# Patient Record
Sex: FEMALE | Race: OTHER | Employment: FULL TIME | URBAN - METROPOLITAN AREA
[De-identification: names, ages, dates, MRNs, and addresses within clinical notes are randomized per-mention and may not be internally consistent; named-entity substitution may affect disease eponyms.]

---

## 2018-11-06 ENCOUNTER — HOSPITAL ENCOUNTER (OUTPATIENT)
Dept: LAB | Age: 48
Discharge: HOME | End: 2018-11-06
Payer: COMMERCIAL

## 2018-11-06 ENCOUNTER — HOSPITAL ENCOUNTER (OUTPATIENT)
Dept: GENERAL RADIOLOGY | Age: 48
Discharge: HOME | End: 2018-11-06
Attending: NEUROLOGICAL SURGERY
Payer: COMMERCIAL

## 2018-11-06 ENCOUNTER — HOSPITAL ENCOUNTER (OUTPATIENT)
Dept: PREADMISSION TESTING | Age: 48
Discharge: HOME | End: 2018-11-06
Payer: COMMERCIAL

## 2018-11-06 DIAGNOSIS — C71.1 MALIGNANT NEOPLASM OF FRONTAL LOBE OF BRAIN (HCC): ICD-10-CM

## 2018-11-06 LAB
ABO + RH BLD: NORMAL
ALBUMIN SERPL-MCNC: 3.9 G/DL (ref 3.4–5)
ALBUMIN/GLOB SERPL: 1.2 {RATIO} (ref 0.8–1.7)
ALP SERPL-CCNC: 71 U/L (ref 45–117)
ALT SERPL-CCNC: 50 U/L (ref 13–56)
ANION GAP SERPL CALC-SCNC: 10 MMOL/L (ref 3–18)
APPEARANCE UR: CLEAR
APTT PPP: 22 SEC (ref 23–36.4)
AST SERPL-CCNC: 21 U/L (ref 15–37)
ATRIAL RATE: 71 BPM
BACTERIA URNS QL MICRO: ABNORMAL /HPF
BASOPHILS # BLD: 0 K/UL (ref 0–0.1)
BASOPHILS NFR BLD: 0 % (ref 0–2)
BILIRUB SERPL-MCNC: 0.5 MG/DL (ref 0.2–1)
BILIRUB UR QL: NEGATIVE
BLOOD GROUP ANTIBODIES SERPL: NORMAL
BUN SERPL-MCNC: 17 MG/DL (ref 7–18)
BUN/CREAT SERPL: 17 (ref 12–20)
CALCIUM SERPL-MCNC: 9 MG/DL (ref 8.5–10.1)
CALCULATED P AXIS, ECG09: 46 DEGREES
CALCULATED R AXIS, ECG10: 67 DEGREES
CALCULATED T AXIS, ECG11: 76 DEGREES
CHLORIDE SERPL-SCNC: 105 MMOL/L (ref 100–108)
CO2 SERPL-SCNC: 27 MMOL/L (ref 21–32)
COLOR UR: YELLOW
CREAT SERPL-MCNC: 1 MG/DL (ref 0.6–1.3)
DIAGNOSIS, 93000: NORMAL
DIFFERENTIAL METHOD BLD: ABNORMAL
EOSINOPHIL # BLD: 0 K/UL (ref 0–0.4)
EOSINOPHIL NFR BLD: 0 % (ref 0–5)
EPITH CASTS URNS QL MICRO: ABNORMAL /LPF (ref 0–5)
ERYTHROCYTE [DISTWIDTH] IN BLOOD BY AUTOMATED COUNT: 15.1 % (ref 11.6–14.5)
GLOBULIN SER CALC-MCNC: 3.3 G/DL (ref 2–4)
GLUCOSE SERPL-MCNC: 137 MG/DL (ref 74–99)
GLUCOSE UR STRIP.AUTO-MCNC: NEGATIVE MG/DL
HCT VFR BLD AUTO: 43.1 % (ref 35–45)
HGB BLD-MCNC: 14.1 G/DL (ref 12–16)
HGB UR QL STRIP: NEGATIVE
INR PPP: 1 (ref 0.8–1.2)
KETONES UR QL STRIP.AUTO: NEGATIVE MG/DL
LEUKOCYTE ESTERASE UR QL STRIP.AUTO: NEGATIVE
LYMPHOCYTES # BLD: 1.7 K/UL (ref 0.9–3.6)
LYMPHOCYTES NFR BLD: 15 % (ref 21–52)
MCH RBC QN AUTO: 28.4 PG (ref 24–34)
MCHC RBC AUTO-ENTMCNC: 32.7 G/DL (ref 31–37)
MCV RBC AUTO: 86.7 FL (ref 74–97)
MONOCYTES # BLD: 0.8 K/UL (ref 0.05–1.2)
MONOCYTES NFR BLD: 7 % (ref 3–10)
MUCOUS THREADS URNS QL MICRO: ABNORMAL /LPF
NEUTS SEG # BLD: 8.6 K/UL (ref 1.8–8)
NEUTS SEG NFR BLD: 78 % (ref 40–73)
NITRITE UR QL STRIP.AUTO: NEGATIVE
P-R INTERVAL, ECG05: 146 MS
PH UR STRIP: 6 [PH] (ref 5–8)
PLATELET # BLD AUTO: 283 K/UL (ref 135–420)
PMV BLD AUTO: 11 FL (ref 9.2–11.8)
POTASSIUM SERPL-SCNC: 3.8 MMOL/L (ref 3.5–5.5)
PROT SERPL-MCNC: 7.2 G/DL (ref 6.4–8.2)
PROT UR STRIP-MCNC: NEGATIVE MG/DL
PROTHROMBIN TIME: 12.6 SEC (ref 11.5–15.2)
Q-T INTERVAL, ECG07: 400 MS
QRS DURATION, ECG06: 86 MS
QTC CALCULATION (BEZET), ECG08: 434 MS
RBC # BLD AUTO: 4.97 M/UL (ref 4.2–5.3)
RBC #/AREA URNS HPF: ABNORMAL /HPF (ref 0–5)
SODIUM SERPL-SCNC: 142 MMOL/L (ref 136–145)
SP GR UR REFRACTOMETRY: 1.02 (ref 1–1.03)
SPECIMEN EXP DATE BLD: NORMAL
UROBILINOGEN UR QL STRIP.AUTO: 0.2 EU/DL (ref 0.2–1)
VENTRICULAR RATE, ECG03: 71 BPM
WBC # BLD AUTO: 11.1 K/UL (ref 4.6–13.2)
WBC URNS QL MICRO: ABNORMAL /HPF (ref 0–4)

## 2018-11-06 PROCEDURE — 71046 X-RAY EXAM CHEST 2 VIEWS: CPT

## 2018-11-06 PROCEDURE — 85025 COMPLETE CBC W/AUTO DIFF WBC: CPT | Performed by: NEUROLOGICAL SURGERY

## 2018-11-06 PROCEDURE — 85610 PROTHROMBIN TIME: CPT | Performed by: NEUROLOGICAL SURGERY

## 2018-11-06 PROCEDURE — 80053 COMPREHEN METABOLIC PANEL: CPT | Performed by: NEUROLOGICAL SURGERY

## 2018-11-06 PROCEDURE — 87086 URINE CULTURE/COLONY COUNT: CPT | Performed by: NEUROLOGICAL SURGERY

## 2018-11-06 PROCEDURE — 85730 THROMBOPLASTIN TIME PARTIAL: CPT | Performed by: NEUROLOGICAL SURGERY

## 2018-11-06 PROCEDURE — 36415 COLL VENOUS BLD VENIPUNCTURE: CPT | Performed by: NEUROLOGICAL SURGERY

## 2018-11-06 PROCEDURE — 93005 ELECTROCARDIOGRAM TRACING: CPT

## 2018-11-06 PROCEDURE — 86900 BLOOD TYPING SEROLOGIC ABO: CPT | Performed by: NEUROLOGICAL SURGERY

## 2018-11-06 PROCEDURE — 81001 URINALYSIS AUTO W/SCOPE: CPT | Performed by: NEUROLOGICAL SURGERY

## 2018-11-06 RX ORDER — LANOLIN ALCOHOL/MO/W.PET/CERES
1000 CREAM (GRAM) TOPICAL DAILY
COMMUNITY
End: 2021-06-01

## 2018-11-06 RX ORDER — RANITIDINE 150 MG/1
150 TABLET, FILM COATED ORAL 2 TIMES DAILY
COMMUNITY
End: 2022-04-19

## 2018-11-06 RX ORDER — LEVETIRACETAM 750 MG/1
750 TABLET ORAL 2 TIMES DAILY
COMMUNITY
End: 2022-04-19

## 2018-11-06 RX ORDER — DEXAMETHASONE 4 MG/1
4 TABLET ORAL EVERY 12 HOURS
COMMUNITY
End: 2021-06-01

## 2018-11-06 NOTE — PERIOP NOTES
PAT - SURGICAL PRE-ADMISSION INSTRUCTIONS    NAME:  Erasmo Sanchez                                                          TODAY'S DATE:  11/6/2018    SURGERY DATE:  11/14/2018                                  SURGERY ARRIVAL TIME:   0700    1. Do NOT eat or drink anything, including candy or gum, after MIDNIGHT on 11/13/18 , unless you have specific instructions from your Surgeon or Anesthesia Provider to do so. 2. No smoking on the day of surgery. 3. No alcohol 24 hours prior to the day of surgery. 4. No recreational drugs for one week prior to the day of surgery. 5. Leave all valuables, including money/purse, at home. 6. Remove all jewelry, nail polish, makeup (including mascara); no lotions, powders, deodorant, or perfume/cologne/after shave. 7. Glasses/Contact lenses and Dentures may be worn to the hospital.  They will be removed prior to surgery. 8. Call your doctor if symptoms of a cold or illness develop within 24 ours prior to surgery. 9. AN ADULT MUST DRIVE YOU HOME AFTER OUTPATIENT SURGERY. 10. If you are having an OUTPATIENT procedure, please make arrangements for a responsible adult to be with you for 24 hours after your surgery. 11. If you are admitted to the hospital, you will be assigned to a bed after surgery is complete. Normally a family member will not be able to see you until you are in your assigned bed. 15. Family is encouraged to accompany you to the hospital.  We ask visitors in the treatment area to be limited to ONE person at a time to ensure patient privacy. EXCEPTIONS WILL BE MADE AS NEEDED. 15. Children under 12 are discouraged from entering the treatment area and need to be supervised by an adult when in the waiting room. Special Instructions:     Take these medications the morning of surgery with a sip of water:  KEPPRA, SYNTHROID, RANITIDINE    Patient Prep:    shower with anti-bacterial soap    These surgical instructions were reviewed with PATIENT during the PAT VISIT. A printed copy of the instructions was provided to PATIENT. Directions: On the morning of surgery, please go to the [facility]. Enter the building from the [facility] entrance, 1st floor (next to the Emergency Room entrance). Take the elevator to the 2nd floor. Sign in at the Registration Desk.     If you have any questions and/or concerns, please do not hesitate to call:  (Prior to the day of surgery)  [facility] unit:  [phone]  (Day of surgery)  [facility] unit:  [phone]

## 2018-11-08 LAB
BACTERIA SPEC CULT: NORMAL
SERVICE CMNT-IMP: NORMAL

## 2018-11-13 ENCOUNTER — ANESTHESIA EVENT (OUTPATIENT)
Dept: SURGERY | Age: 48
DRG: 027 | End: 2018-11-13
Payer: COMMERCIAL

## 2018-11-14 ENCOUNTER — ANESTHESIA (OUTPATIENT)
Dept: SURGERY | Age: 48
DRG: 027 | End: 2018-11-14
Payer: COMMERCIAL

## 2018-11-14 ENCOUNTER — APPOINTMENT (OUTPATIENT)
Dept: MRI IMAGING | Age: 48
DRG: 027 | End: 2018-11-14
Attending: NEUROLOGICAL SURGERY
Payer: COMMERCIAL

## 2018-11-14 ENCOUNTER — HOSPITAL ENCOUNTER (INPATIENT)
Age: 48
LOS: 4 days | Discharge: HOME | DRG: 027 | End: 2018-11-18
Attending: NEUROLOGICAL SURGERY | Admitting: NEUROLOGICAL SURGERY
Payer: COMMERCIAL

## 2018-11-14 DIAGNOSIS — G93.89 RIGHT FRONTAL LOBE MASS: Primary | ICD-10-CM

## 2018-11-14 LAB — HCG UR QL: NEGATIVE

## 2018-11-14 PROCEDURE — 03HY32Z INSERTION OF MONITORING DEVICE INTO UPPER ARTERY, PERCUTANEOUS APPROACH: ICD-10-PCS | Performed by: ANESTHESIOLOGY

## 2018-11-14 PROCEDURE — 77030020236 HC IMPL CLMP CRNOFX AESC -B: Performed by: NEUROLOGICAL SURGERY

## 2018-11-14 PROCEDURE — 77030011894 HC TY FOL URIMTR BARD -B: Performed by: NEUROLOGICAL SURGERY

## 2018-11-14 PROCEDURE — 74011250636 HC RX REV CODE- 250/636

## 2018-11-14 PROCEDURE — 77030018673: Performed by: NEUROLOGICAL SURGERY

## 2018-11-14 PROCEDURE — 88377 M/PHMTRC ALYS ISHQUANT/SEMIQ: CPT

## 2018-11-14 PROCEDURE — 74011250637 HC RX REV CODE- 250/637: Performed by: NURSE ANESTHETIST, CERTIFIED REGISTERED

## 2018-11-14 PROCEDURE — 88341 IMHCHEM/IMCYTCHM EA ADD ANTB: CPT

## 2018-11-14 PROCEDURE — 00B00ZZ EXCISION OF BRAIN, OPEN APPROACH: ICD-10-PCS | Performed by: NEUROLOGICAL SURGERY

## 2018-11-14 PROCEDURE — A9575 INJ GADOTERATE MEGLUMI 0.1ML: HCPCS | Performed by: NEUROLOGICAL SURGERY

## 2018-11-14 PROCEDURE — 77030002946 HC SUT NRLN J&J -B: Performed by: NEUROLOGICAL SURGERY

## 2018-11-14 PROCEDURE — 77030020268 HC MISC GENERAL SUPPLY: Performed by: NEUROLOGICAL SURGERY

## 2018-11-14 PROCEDURE — 88307 TISSUE EXAM BY PATHOLOGIST: CPT

## 2018-11-14 PROCEDURE — 77030032490 HC SLV COMPR SCD KNE COVD -B: Performed by: NEUROLOGICAL SURGERY

## 2018-11-14 PROCEDURE — 77030014647 HC SEAL FBRN TISSL BAXT -D: Performed by: NEUROLOGICAL SURGERY

## 2018-11-14 PROCEDURE — 76060000040 HC ANESTHESIA 4.5 TO 5 HR: Performed by: NEUROLOGICAL SURGERY

## 2018-11-14 PROCEDURE — 77030008683 HC TU ET CUF COVD -A: Performed by: NURSE ANESTHETIST, CERTIFIED REGISTERED

## 2018-11-14 PROCEDURE — 77030029099 HC BN WAX SSPC -A: Performed by: NEUROLOGICAL SURGERY

## 2018-11-14 PROCEDURE — 76010000176 HC OR TIME 4.5 TO 5 HR INTENSV-TIER 1: Performed by: NEUROLOGICAL SURGERY

## 2018-11-14 PROCEDURE — 77030034348 HC TIP STR SONOPET DISP STRY -E: Performed by: NEUROLOGICAL SURGERY

## 2018-11-14 PROCEDURE — 77030005401 HC CATH RAD ARRO -A: Performed by: NURSE ANESTHETIST, CERTIFIED REGISTERED

## 2018-11-14 PROCEDURE — 77030013079 HC BLNKT BAIR HGGR 3M -A: Performed by: NURSE ANESTHETIST, CERTIFIED REGISTERED

## 2018-11-14 PROCEDURE — 77030018390 HC SPNG HEMSTAT2 J&J -B: Performed by: NEUROLOGICAL SURGERY

## 2018-11-14 PROCEDURE — 77030030722 HC PIN SKULL MAYFLD INLC -B: Performed by: NEUROLOGICAL SURGERY

## 2018-11-14 PROCEDURE — 77030013473 HC CRD BPLR AESC -A: Performed by: NEUROLOGICAL SURGERY

## 2018-11-14 PROCEDURE — 4A133B1 MONITORING OF ARTERIAL PRESSURE, PERIPHERAL, PERCUTANEOUS APPROACH: ICD-10-PCS | Performed by: ANESTHESIOLOGY

## 2018-11-14 PROCEDURE — 74011250636 HC RX REV CODE- 250/636: Performed by: NEUROLOGICAL SURGERY

## 2018-11-14 PROCEDURE — 74011000272 HC RX REV CODE- 272: Performed by: NEUROLOGICAL SURGERY

## 2018-11-14 PROCEDURE — 77030008477 HC STYL SATN SLP COVD -A: Performed by: NURSE ANESTHETIST, CERTIFIED REGISTERED

## 2018-11-14 PROCEDURE — 65610000009 HC RM ICU NEURO

## 2018-11-14 PROCEDURE — 74011000250 HC RX REV CODE- 250: Performed by: NEUROLOGICAL SURGERY

## 2018-11-14 PROCEDURE — 74011000250 HC RX REV CODE- 250

## 2018-11-14 PROCEDURE — 77030037673 HC TBNG VISTA V-LYTE PMP STRY -B: Performed by: NEUROLOGICAL SURGERY

## 2018-11-14 PROCEDURE — 77030026918 HC ADMN ST IV BLD BD -A: Performed by: NURSE ANESTHETIST, CERTIFIED REGISTERED

## 2018-11-14 PROCEDURE — 76210000006 HC OR PH I REC 0.5 TO 1 HR: Performed by: NEUROLOGICAL SURGERY

## 2018-11-14 PROCEDURE — 74011250636 HC RX REV CODE- 250/636: Performed by: NURSE ANESTHETIST, CERTIFIED REGISTERED

## 2018-11-14 PROCEDURE — 88342 IMHCHEM/IMCYTCHM 1ST ANTB: CPT

## 2018-11-14 PROCEDURE — 74011000258 HC RX REV CODE- 258: Performed by: NEUROLOGICAL SURGERY

## 2018-11-14 PROCEDURE — 77030020480 HC CLP SCLP RANY DISP J&J -A: Performed by: NEUROLOGICAL SURGERY

## 2018-11-14 PROCEDURE — 77030013797 HC KT TRNSDUC PRSSR EDWD -A: Performed by: NURSE ANESTHETIST, CERTIFIED REGISTERED

## 2018-11-14 PROCEDURE — 4A133J1 MONITORING OF ARTERIAL PULSE, PERIPHERAL, PERCUTANEOUS APPROACH: ICD-10-PCS | Performed by: ANESTHESIOLOGY

## 2018-11-14 PROCEDURE — 77030019908 HC STETH ESOPH SIMS -A: Performed by: NURSE ANESTHETIST, CERTIFIED REGISTERED

## 2018-11-14 PROCEDURE — 77030029716 HC SEAL SPN HEMSTAT DURASL KT INLC -F: Performed by: NEUROLOGICAL SURGERY

## 2018-11-14 PROCEDURE — 77030003028 HC SUT VCRL J&J -A: Performed by: NEUROLOGICAL SURGERY

## 2018-11-14 PROCEDURE — 77030004472 HC BUR TAPR MEDT -B: Performed by: NEUROLOGICAL SURGERY

## 2018-11-14 PROCEDURE — 88331 PATH CONSLTJ SURG 1 BLK 1SPC: CPT

## 2018-11-14 PROCEDURE — 81025 URINE PREGNANCY TEST: CPT

## 2018-11-14 PROCEDURE — 8E09XBH COMPUTER ASSISTED PROCEDURE OF HEAD AND NECK REGION, WITH MAGNETIC RESONANCE IMAGING: ICD-10-PCS | Performed by: NEUROLOGICAL SURGERY

## 2018-11-14 PROCEDURE — 70552 MRI BRAIN STEM W/DYE: CPT

## 2018-11-14 PROCEDURE — 74011250637 HC RX REV CODE- 250/637: Performed by: NEUROLOGICAL SURGERY

## 2018-11-14 PROCEDURE — 77030003162 HC GRFT DURA SUB MEDT -F: Performed by: NEUROLOGICAL SURGERY

## 2018-11-14 DEVICE — DURA 62105 SUBSTITUTE DUREPAIR 3X3IN NCE
Type: IMPLANTABLE DEVICE | Site: CRANIAL | Status: FUNCTIONAL
Brand: DUREPAIR®

## 2018-11-14 DEVICE — CRANIOFIX 2 TITANIUM CLAMP 11MM
Type: IMPLANTABLE DEVICE | Site: SKULL | Status: FUNCTIONAL
Brand: CRANIOFIX2

## 2018-11-14 RX ORDER — MAGNESIUM SULFATE 100 %
4 CRYSTALS MISCELLANEOUS AS NEEDED
Status: DISCONTINUED | OUTPATIENT
Start: 2018-11-14 | End: 2018-11-14 | Stop reason: HOSPADM

## 2018-11-14 RX ORDER — PROPOFOL 10 MG/ML
INJECTION, EMULSION INTRAVENOUS AS NEEDED
Status: DISCONTINUED | OUTPATIENT
Start: 2018-11-14 | End: 2018-11-14 | Stop reason: HOSPADM

## 2018-11-14 RX ORDER — DEXAMETHASONE SODIUM PHOSPHATE 4 MG/ML
4 INJECTION, SOLUTION INTRA-ARTICULAR; INTRALESIONAL; INTRAMUSCULAR; INTRAVENOUS; SOFT TISSUE EVERY 6 HOURS
Status: DISCONTINUED | OUTPATIENT
Start: 2018-11-14 | End: 2018-11-15

## 2018-11-14 RX ORDER — LIDOCAINE HYDROCHLORIDE 20 MG/ML
INJECTION, SOLUTION EPIDURAL; INFILTRATION; INTRACAUDAL; PERINEURAL AS NEEDED
Status: DISCONTINUED | OUTPATIENT
Start: 2018-11-14 | End: 2018-11-14 | Stop reason: HOSPADM

## 2018-11-14 RX ORDER — FUROSEMIDE 10 MG/ML
INJECTION INTRAMUSCULAR; INTRAVENOUS AS NEEDED
Status: DISCONTINUED | OUTPATIENT
Start: 2018-11-14 | End: 2018-11-14 | Stop reason: HOSPADM

## 2018-11-14 RX ORDER — LEVETIRACETAM 10 MG/ML
INJECTION INTRAVASCULAR AS NEEDED
Status: DISCONTINUED | OUTPATIENT
Start: 2018-11-14 | End: 2018-11-14 | Stop reason: HOSPADM

## 2018-11-14 RX ORDER — SODIUM CHLORIDE, SODIUM LACTATE, POTASSIUM CHLORIDE, CALCIUM CHLORIDE 600; 310; 30; 20 MG/100ML; MG/100ML; MG/100ML; MG/100ML
100 INJECTION, SOLUTION INTRAVENOUS CONTINUOUS
Status: DISCONTINUED | OUTPATIENT
Start: 2018-11-14 | End: 2018-11-14 | Stop reason: HOSPADM

## 2018-11-14 RX ORDER — SODIUM CHLORIDE 9 MG/ML
75 INJECTION, SOLUTION INTRAVENOUS CONTINUOUS
Status: DISCONTINUED | OUTPATIENT
Start: 2018-11-14 | End: 2018-11-15

## 2018-11-14 RX ORDER — DEXTROSE MONOHYDRATE 25 G/50ML
25-50 INJECTION, SOLUTION INTRAVENOUS AS NEEDED
Status: DISCONTINUED | OUTPATIENT
Start: 2018-11-14 | End: 2018-11-14 | Stop reason: HOSPADM

## 2018-11-14 RX ORDER — ACETAMINOPHEN 325 MG/1
650 TABLET ORAL
Status: DISCONTINUED | OUTPATIENT
Start: 2018-11-14 | End: 2018-11-18 | Stop reason: HOSPADM

## 2018-11-14 RX ORDER — FENTANYL CITRATE 50 UG/ML
INJECTION, SOLUTION INTRAMUSCULAR; INTRAVENOUS AS NEEDED
Status: DISCONTINUED | OUTPATIENT
Start: 2018-11-14 | End: 2018-11-14 | Stop reason: HOSPADM

## 2018-11-14 RX ORDER — VANCOMYCIN HYDROCHLORIDE 1 G/20ML
INJECTION, POWDER, LYOPHILIZED, FOR SOLUTION INTRAVENOUS AS NEEDED
Status: DISCONTINUED | OUTPATIENT
Start: 2018-11-14 | End: 2018-11-14 | Stop reason: HOSPADM

## 2018-11-14 RX ORDER — ONDANSETRON 2 MG/ML
4 INJECTION INTRAMUSCULAR; INTRAVENOUS
Status: DISCONTINUED | OUTPATIENT
Start: 2018-11-14 | End: 2018-11-18 | Stop reason: HOSPADM

## 2018-11-14 RX ORDER — LABETALOL HYDROCHLORIDE 5 MG/ML
INJECTION, SOLUTION INTRAVENOUS AS NEEDED
Status: DISCONTINUED | OUTPATIENT
Start: 2018-11-14 | End: 2018-11-14 | Stop reason: HOSPADM

## 2018-11-14 RX ORDER — SODIUM CHLORIDE 9 MG/ML
INJECTION, SOLUTION INTRAVENOUS
Status: DISCONTINUED | OUTPATIENT
Start: 2018-11-14 | End: 2018-11-14 | Stop reason: HOSPADM

## 2018-11-14 RX ORDER — NEOSTIGMINE METHYLSULFATE 5 MG/5 ML
SYRINGE (ML) INTRAVENOUS AS NEEDED
Status: DISCONTINUED | OUTPATIENT
Start: 2018-11-14 | End: 2018-11-14 | Stop reason: HOSPADM

## 2018-11-14 RX ORDER — POTASSIUM CHLORIDE AND SODIUM CHLORIDE 900; 300 MG/100ML; MG/100ML
INJECTION, SOLUTION INTRAVENOUS CONTINUOUS
Status: DISCONTINUED | OUTPATIENT
Start: 2018-11-14 | End: 2018-11-15

## 2018-11-14 RX ORDER — HYDROMORPHONE HYDROCHLORIDE 1 MG/ML
1 INJECTION, SOLUTION INTRAMUSCULAR; INTRAVENOUS; SUBCUTANEOUS
Status: DISCONTINUED | OUTPATIENT
Start: 2018-11-14 | End: 2018-11-18 | Stop reason: HOSPADM

## 2018-11-14 RX ORDER — OXYCODONE AND ACETAMINOPHEN 5; 325 MG/1; MG/1
1 TABLET ORAL AS NEEDED
Status: DISCONTINUED | OUTPATIENT
Start: 2018-11-14 | End: 2018-11-14 | Stop reason: HOSPADM

## 2018-11-14 RX ORDER — RANITIDINE 150 MG/1
150 TABLET, FILM COATED ORAL 2 TIMES DAILY
Status: DISCONTINUED | OUTPATIENT
Start: 2018-11-14 | End: 2018-11-18 | Stop reason: HOSPADM

## 2018-11-14 RX ORDER — HYDROMORPHONE HYDROCHLORIDE 2 MG/ML
0.5 INJECTION, SOLUTION INTRAMUSCULAR; INTRAVENOUS; SUBCUTANEOUS
Status: DISCONTINUED | OUTPATIENT
Start: 2018-11-14 | End: 2018-11-14 | Stop reason: HOSPADM

## 2018-11-14 RX ORDER — LEVOTHYROXINE SODIUM 100 UG/1
200 TABLET ORAL
Status: DISCONTINUED | OUTPATIENT
Start: 2018-11-15 | End: 2018-11-18 | Stop reason: HOSPADM

## 2018-11-14 RX ORDER — GLYCOPYRROLATE 0.2 MG/ML
INJECTION INTRAMUSCULAR; INTRAVENOUS AS NEEDED
Status: DISCONTINUED | OUTPATIENT
Start: 2018-11-14 | End: 2018-11-14 | Stop reason: HOSPADM

## 2018-11-14 RX ORDER — NICARDIPINE HYDROCHLORIDE 0.1 MG/ML
INJECTION INTRAVENOUS
Status: DISCONTINUED | OUTPATIENT
Start: 2018-11-14 | End: 2018-11-14 | Stop reason: HOSPADM

## 2018-11-14 RX ORDER — HYDROMORPHONE HYDROCHLORIDE 2 MG/ML
0.2 INJECTION, SOLUTION INTRAMUSCULAR; INTRAVENOUS; SUBCUTANEOUS AS NEEDED
Status: DISCONTINUED | OUTPATIENT
Start: 2018-11-14 | End: 2018-11-14 | Stop reason: HOSPADM

## 2018-11-14 RX ORDER — ONDANSETRON 2 MG/ML
INJECTION INTRAMUSCULAR; INTRAVENOUS AS NEEDED
Status: DISCONTINUED | OUTPATIENT
Start: 2018-11-14 | End: 2018-11-14 | Stop reason: HOSPADM

## 2018-11-14 RX ORDER — MANNITOL 20 G/100ML
INJECTION, SOLUTION INTRAVENOUS
Status: DISCONTINUED | OUTPATIENT
Start: 2018-11-14 | End: 2018-11-14 | Stop reason: HOSPADM

## 2018-11-14 RX ORDER — FAMOTIDINE 20 MG/1
20 TABLET, FILM COATED ORAL ONCE
Status: COMPLETED | OUTPATIENT
Start: 2018-11-14 | End: 2018-11-14

## 2018-11-14 RX ORDER — DIPHENHYDRAMINE HYDROCHLORIDE 50 MG/ML
12.5 INJECTION, SOLUTION INTRAMUSCULAR; INTRAVENOUS
Status: DISCONTINUED | OUTPATIENT
Start: 2018-11-14 | End: 2018-11-14 | Stop reason: HOSPADM

## 2018-11-14 RX ORDER — SCOLOPAMINE TRANSDERMAL SYSTEM 1 MG/1
PATCH, EXTENDED RELEASE TRANSDERMAL
Status: DISPENSED
Start: 2018-11-14 | End: 2018-11-14

## 2018-11-14 RX ORDER — SODIUM CHLORIDE, SODIUM LACTATE, POTASSIUM CHLORIDE, CALCIUM CHLORIDE 600; 310; 30; 20 MG/100ML; MG/100ML; MG/100ML; MG/100ML
50 INJECTION, SOLUTION INTRAVENOUS CONTINUOUS
Status: DISCONTINUED | OUTPATIENT
Start: 2018-11-14 | End: 2018-11-14

## 2018-11-14 RX ORDER — VECURONIUM BROMIDE FOR INJECTION 1 MG/ML
INJECTION, POWDER, LYOPHILIZED, FOR SOLUTION INTRAVENOUS AS NEEDED
Status: DISCONTINUED | OUTPATIENT
Start: 2018-11-14 | End: 2018-11-14 | Stop reason: HOSPADM

## 2018-11-14 RX ORDER — SCOLOPAMINE TRANSDERMAL SYSTEM 1 MG/1
1 PATCH, EXTENDED RELEASE TRANSDERMAL
Status: COMPLETED | OUTPATIENT
Start: 2018-11-14 | End: 2018-11-17

## 2018-11-14 RX ORDER — MIDAZOLAM HYDROCHLORIDE 1 MG/ML
INJECTION, SOLUTION INTRAMUSCULAR; INTRAVENOUS AS NEEDED
Status: DISCONTINUED | OUTPATIENT
Start: 2018-11-14 | End: 2018-11-14 | Stop reason: HOSPADM

## 2018-11-14 RX ORDER — INSULIN LISPRO 100 [IU]/ML
INJECTION, SOLUTION INTRAVENOUS; SUBCUTANEOUS ONCE
Status: DISCONTINUED | OUTPATIENT
Start: 2018-11-14 | End: 2018-11-14 | Stop reason: HOSPADM

## 2018-11-14 RX ORDER — SODIUM CHLORIDE 9 MG/ML
50 INJECTION, SOLUTION INTRAVENOUS CONTINUOUS
Status: DISCONTINUED | OUTPATIENT
Start: 2018-11-14 | End: 2018-11-14

## 2018-11-14 RX ORDER — SODIUM CHLORIDE 0.9 % (FLUSH) 0.9 %
5-10 SYRINGE (ML) INJECTION AS NEEDED
Status: DISCONTINUED | OUTPATIENT
Start: 2018-11-14 | End: 2018-11-14 | Stop reason: HOSPADM

## 2018-11-14 RX ORDER — EPHEDRINE SULFATE 50 MG/ML
INJECTION, SOLUTION INTRAVENOUS AS NEEDED
Status: DISCONTINUED | OUTPATIENT
Start: 2018-11-14 | End: 2018-11-14 | Stop reason: HOSPADM

## 2018-11-14 RX ORDER — ONDANSETRON 2 MG/ML
INJECTION INTRAMUSCULAR; INTRAVENOUS
Status: COMPLETED
Start: 2018-11-14 | End: 2018-11-14

## 2018-11-14 RX ORDER — LIDOCAINE HYDROCHLORIDE AND EPINEPHRINE 10; 10 MG/ML; UG/ML
INJECTION, SOLUTION INFILTRATION; PERINEURAL AS NEEDED
Status: DISCONTINUED | OUTPATIENT
Start: 2018-11-14 | End: 2018-11-14 | Stop reason: HOSPADM

## 2018-11-14 RX ORDER — GADOTERATE MEGLUMINE 376.9 MG/ML
20 INJECTION INTRAVENOUS
Status: COMPLETED | OUTPATIENT
Start: 2018-11-14 | End: 2018-11-14

## 2018-11-14 RX ORDER — OXYCODONE AND ACETAMINOPHEN 5; 325 MG/1; MG/1
1 TABLET ORAL
Status: DISCONTINUED | OUTPATIENT
Start: 2018-11-14 | End: 2018-11-18 | Stop reason: HOSPADM

## 2018-11-14 RX ORDER — DEXAMETHASONE SODIUM PHOSPHATE 4 MG/ML
INJECTION, SOLUTION INTRA-ARTICULAR; INTRALESIONAL; INTRAMUSCULAR; INTRAVENOUS; SOFT TISSUE AS NEEDED
Status: DISCONTINUED | OUTPATIENT
Start: 2018-11-14 | End: 2018-11-14 | Stop reason: HOSPADM

## 2018-11-14 RX ORDER — HYDROMORPHONE HYDROCHLORIDE 1 MG/ML
INJECTION, SOLUTION INTRAMUSCULAR; INTRAVENOUS; SUBCUTANEOUS AS NEEDED
Status: DISCONTINUED | OUTPATIENT
Start: 2018-11-14 | End: 2018-11-14 | Stop reason: HOSPADM

## 2018-11-14 RX ADMIN — VECURONIUM BROMIDE FOR INJECTION 2 MG: 1 INJECTION, POWDER, LYOPHILIZED, FOR SOLUTION INTRAVENOUS at 11:00

## 2018-11-14 RX ADMIN — HYDROMORPHONE HYDROCHLORIDE 1 MG: 1 INJECTION, SOLUTION INTRAMUSCULAR; INTRAVENOUS; SUBCUTANEOUS at 10:03

## 2018-11-14 RX ADMIN — VECURONIUM BROMIDE FOR INJECTION 1 MG: 1 INJECTION, POWDER, LYOPHILIZED, FOR SOLUTION INTRAVENOUS at 12:15

## 2018-11-14 RX ADMIN — NICARDIPINE HYDROCHLORIDE 2.5 MG/HR: 2.5 INJECTION INTRAVENOUS at 18:48

## 2018-11-14 RX ADMIN — EPHEDRINE SULFATE 10 MG: 50 INJECTION, SOLUTION INTRAVENOUS at 10:03

## 2018-11-14 RX ADMIN — GADOTERATE MEGLUMINE 20 ML: 376.9 INJECTION INTRAVENOUS at 07:26

## 2018-11-14 RX ADMIN — MIDAZOLAM HYDROCHLORIDE 2 MG: 1 INJECTION, SOLUTION INTRAMUSCULAR; INTRAVENOUS at 09:08

## 2018-11-14 RX ADMIN — ONDANSETRON 4 MG: 2 INJECTION INTRAMUSCULAR; INTRAVENOUS at 13:35

## 2018-11-14 RX ADMIN — LABETALOL HYDROCHLORIDE 5 MG: 5 INJECTION, SOLUTION INTRAVENOUS at 13:52

## 2018-11-14 RX ADMIN — HYDROMORPHONE HYDROCHLORIDE 1 MG: 1 INJECTION, SOLUTION INTRAMUSCULAR; INTRAVENOUS; SUBCUTANEOUS at 23:55

## 2018-11-14 RX ADMIN — PROPOFOL 150 MG: 10 INJECTION, EMULSION INTRAVENOUS at 09:16

## 2018-11-14 RX ADMIN — DEXAMETHASONE SODIUM PHOSPHATE 10 MG: 4 INJECTION, SOLUTION INTRA-ARTICULAR; INTRALESIONAL; INTRAMUSCULAR; INTRAVENOUS; SOFT TISSUE at 09:23

## 2018-11-14 RX ADMIN — VECURONIUM BROMIDE FOR INJECTION 10 MG: 1 INJECTION, POWDER, LYOPHILIZED, FOR SOLUTION INTRAVENOUS at 09:17

## 2018-11-14 RX ADMIN — RANITIDINE 150 MG: 150 TABLET ORAL at 20:50

## 2018-11-14 RX ADMIN — DEXAMETHASONE SODIUM PHOSPHATE 10 MG: 4 INJECTION, SOLUTION INTRA-ARTICULAR; INTRALESIONAL; INTRAMUSCULAR; INTRAVENOUS; SOFT TISSUE at 12:30

## 2018-11-14 RX ADMIN — LEVETIRACETAM 750 MG: 100 INJECTION INTRAVENOUS at 21:04

## 2018-11-14 RX ADMIN — CEFAZOLIN 3 G: 1 INJECTION, POWDER, FOR SOLUTION INTRAMUSCULAR; INTRAVENOUS; PARENTERAL at 13:03

## 2018-11-14 RX ADMIN — FAMOTIDINE 20 MG: 20 TABLET ORAL at 06:35

## 2018-11-14 RX ADMIN — FENTANYL CITRATE 200 MCG: 50 INJECTION, SOLUTION INTRAMUSCULAR; INTRAVENOUS at 09:14

## 2018-11-14 RX ADMIN — LABETALOL HYDROCHLORIDE 5 MG: 5 INJECTION, SOLUTION INTRAVENOUS at 13:55

## 2018-11-14 RX ADMIN — LIDOCAINE HYDROCHLORIDE 100 MG: 20 INJECTION, SOLUTION EPIDURAL; INFILTRATION; INTRACAUDAL; PERINEURAL at 09:14

## 2018-11-14 RX ADMIN — HYDROMORPHONE HYDROCHLORIDE 1 MG: 1 INJECTION, SOLUTION INTRAMUSCULAR; INTRAVENOUS; SUBCUTANEOUS at 17:42

## 2018-11-14 RX ADMIN — DEXAMETHASONE SODIUM PHOSPHATE 4 MG: 4 INJECTION, SOLUTION INTRAMUSCULAR; INTRAVENOUS at 23:48

## 2018-11-14 RX ADMIN — MANNITOL: 20 INJECTION, SOLUTION INTRAVENOUS at 09:50

## 2018-11-14 RX ADMIN — ONDANSETRON 4 MG: 2 INJECTION INTRAMUSCULAR; INTRAVENOUS at 15:10

## 2018-11-14 RX ADMIN — GLYCOPYRROLATE 0.4 MG: 0.2 INJECTION INTRAMUSCULAR; INTRAVENOUS at 13:35

## 2018-11-14 RX ADMIN — VECURONIUM BROMIDE FOR INJECTION 1 MG: 1 INJECTION, POWDER, LYOPHILIZED, FOR SOLUTION INTRAVENOUS at 11:39

## 2018-11-14 RX ADMIN — PROPOFOL 100 MG: 10 INJECTION, EMULSION INTRAVENOUS at 09:38

## 2018-11-14 RX ADMIN — SODIUM CHLORIDE: 9 INJECTION, SOLUTION INTRAVENOUS at 09:35

## 2018-11-14 RX ADMIN — Medication 3 MG: at 13:35

## 2018-11-14 RX ADMIN — VECURONIUM BROMIDE FOR INJECTION 1 MG: 1 INJECTION, POWDER, LYOPHILIZED, FOR SOLUTION INTRAVENOUS at 12:34

## 2018-11-14 RX ADMIN — LEVETIRACETAM 1000 MG: 10 INJECTION INTRAVASCULAR at 09:37

## 2018-11-14 RX ADMIN — HYDROMORPHONE HYDROCHLORIDE 1 MG: 1 INJECTION, SOLUTION INTRAMUSCULAR; INTRAVENOUS; SUBCUTANEOUS at 19:40

## 2018-11-14 RX ADMIN — NICARDIPINE HYDROCHLORIDE 5 MG/HR: 0.1 INJECTION INTRAVENOUS at 10:19

## 2018-11-14 RX ADMIN — EPHEDRINE SULFATE 10 MG: 50 INJECTION, SOLUTION INTRAVENOUS at 11:00

## 2018-11-14 RX ADMIN — DEXAMETHASONE SODIUM PHOSPHATE 4 MG: 4 INJECTION, SOLUTION INTRAMUSCULAR; INTRAVENOUS at 18:38

## 2018-11-14 RX ADMIN — FUROSEMIDE 10 MG: 10 INJECTION INTRAMUSCULAR; INTRAVENOUS at 09:22

## 2018-11-14 RX ADMIN — VECURONIUM BROMIDE FOR INJECTION 1 MG: 1 INJECTION, POWDER, LYOPHILIZED, FOR SOLUTION INTRAVENOUS at 13:04

## 2018-11-14 RX ADMIN — ONDANSETRON 4 MG: 2 INJECTION INTRAMUSCULAR; INTRAVENOUS at 19:34

## 2018-11-14 RX ADMIN — ONDANSETRON 4 MG: 2 INJECTION INTRAMUSCULAR; INTRAVENOUS at 23:51

## 2018-11-14 RX ADMIN — FENTANYL CITRATE 50 MCG: 50 INJECTION, SOLUTION INTRAMUSCULAR; INTRAVENOUS at 10:18

## 2018-11-14 RX ADMIN — CEFAZOLIN 3 G: 1 INJECTION, POWDER, FOR SOLUTION INTRAMUSCULAR; INTRAVENOUS; PARENTERAL at 09:23

## 2018-11-14 RX ADMIN — CEFAZOLIN 3 G: 1 INJECTION, POWDER, FOR SOLUTION INTRAMUSCULAR; INTRAVENOUS; PARENTERAL at 21:47

## 2018-11-14 RX ADMIN — SODIUM CHLORIDE AND POTASSIUM CHLORIDE: 9; 2.98 INJECTION, SOLUTION INTRAVENOUS at 18:39

## 2018-11-14 RX ADMIN — SODIUM CHLORIDE 50 ML/HR: 900 INJECTION, SOLUTION INTRAVENOUS at 08:43

## 2018-11-14 RX ADMIN — OXYCODONE AND ACETAMINOPHEN 1 TABLET: 5; 325 TABLET ORAL at 21:19

## 2018-11-14 NOTE — ANESTHESIA PROCEDURE NOTES
Arterial Line Placement Start time: 11/14/2018 9:20 AM 
End time: 11/14/2018 9:25 AM 
Performed by: Tha Garduno CRNA Authorized by: Higinio Alcocer MD  
 
Pre-Procedure Indications:  Arterial pressure monitoring Preanesthetic Checklist: patient identified, risks and benefits discussed, anesthesia consent, site marked, patient being monitored, timeout performed and patient being monitored Timeout Time: 09:19 Procedure:  
Prep:  Povidone-iodine Seldinger Technique?: Yes Orientation:  Left Location:  Radial artery Catheter size:  20 G Number of attempts:  1 Cont Cardiac Output Sensor: No   
 
Assessment:  
Post-procedure:  Line secured and sterile dressing applied Patient Tolerance:  Patient tolerated the procedure well with no immediate complications

## 2018-11-14 NOTE — INTERVAL H&P NOTE
H&P Update:  Jordan Knight was seen and examined. History and physical has been reviewed. The patient has been examined.  There have been no significant clinical changes since the completion of the originally dated History and Physical.    Signed By: Harry Campos MD     November 14, 2018 6:21 AM

## 2018-11-14 NOTE — ANESTHESIA PREPROCEDURE EVALUATION
Anesthetic History PONV Review of Systems / Medical History Patient summary reviewed and pertinent labs reviewed Pulmonary Within defined limits Neuro/Psych  
 
seizures (one seizure in october) Cardiovascular Within defined limits Exercise tolerance: >4 METS 
  
GI/Hepatic/Renal 
  
 
 
Renal disease: stones Endo/Other Hypothyroidism: well controlled Other Findings Comments:  
 
 
  
 
 
 
 
Physical Exam 
 
Airway Mallampati: III 
TM Distance: 4 - 6 cm Neck ROM: normal range of motion Mouth opening: Normal 
 
 Cardiovascular Regular rate and rhythm,  S1 and S2 normal,  no murmur, click, rub, or gallop Rhythm: regular Rate: normal 
 
 
 
 Dental 
No notable dental hx Pulmonary Breath sounds clear to auscultation Abdominal 
GI exam deferred Other Findings Anesthetic Plan ASA: 3 Anesthesia type: general 
 
Monitoring Plan: Arterial line Induction: Intravenous Anesthetic plan and risks discussed with: Patient

## 2018-11-14 NOTE — BRIEF OP NOTE
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    Procedure(s):  image guided right craniotomy for resection of brain tumor  Surgeon(s) and Role:     Cm Ash, Antonieta Nieves MD - Primary         Surgical Assistant: angely    Surgical Staff:  Circ-1: Rosemary Salas RN  Circ-Relief: Norman Vaca RN  Scrub Tech-1: [place] Dasha Tech-2: Kelli Lick  Scrub Tech-Relief: Rudie Cyphers  Surg Asst-1: Phoebe Fallen  Surg Asst-Relief: Geoffry Nicks E  Event Time In Time Out   Incision Start 1007    Incision Close 1349      Anesthesia: General   Estimated Blood Loss: 1090  Specimens:   ID Type Source Tests Collected by Time Destination   1 : Right frontal brain tumor Frozen Section Brain  Marian Jackson MD 11/14/2018 1120 Pathology   2 : Right frontal brain tumor Frozen Section Brain  Marian Jackson MD 11/14/2018 1132 Pathology   3 : RIGHT FRONTAL BRAIN TUMOR Preservative Brain  Marian Jackson MD 11/14/2018 1245 Pathology      Findings: large tumor   Complications: none  Implants:   Implant Name Type Inv.  Item Serial No.  Lot No. LRB No. 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

## 2018-11-14 NOTE — ANESTHESIA POSTPROCEDURE EVALUATION
Procedure(s): 
image guided right craniotomy for resection of brain tumor. Anesthesia Post Evaluation Multimodal analgesia: multimodal analgesia used between 6 hours prior to anesthesia start to PACU discharge Patient location during evaluation: bedside Patient participation: complete - patient participated Level of consciousness: awake Pain management: adequate Airway patency: patent Anesthetic complications: no 
Cardiovascular status: acceptable Respiratory status: acceptable Hydration status: acceptable Post anesthesia nausea and vomiting:  controlled Visit Vitals /72 Pulse 67 Temp 36.8 °C (98.2 °F) Resp 17 Ht 6' (1.829 m) Wt 119.3 kg (263 lb) SpO2 92% BMI 35.67 kg/m²

## 2018-11-14 NOTE — PROGRESS NOTES
conducted a pre-surgery visit with Erasmo Sanchez, who is a 53 y.o.,female. The  provided the following Interventions:  Initiated a relationship of care and support. Offered prayer and assurance of continued prayers on patient's behalf. Plan:  Chaplains will continue to follow and will provide pastoral care on an as needed/requested basis.  recommends bedside caregivers page  on duty if patient shows signs of acute spiritual or emotional distress.     Aiden Colvin   Spiritual Care   [phone]

## 2018-11-14 NOTE — CONSULTS
Internal Medicine Consult          Consult Requested By: Dr. Shaun Echeverria, specialty    Subjective     HPI: Emmy Pratt is a 53 y.o. female with a PMHx of who we were consulted for medical management while undergoing image guided right craniotomy for resection of brain tumor    PMHx:  Past Medical History:   Diagnosis Date    Hiatal hernia     Hypothyroidism     Kidney stone     Nausea & vomiting     Seizures ([facility] [address] 10/17/2018    X1       PSurgHx:  Past Surgical History:   Procedure Laterality Date    BREAST SURGERY PROCEDURE UNLISTED      HX BREAST LUMPECTOMY Right 2015    HX CHOLECYSTECTOMY      HX OTHER SURGICAL  2007    tubal pregnancy        SocialHx:  Social History     Socioeconomic History    Marital status:      Spouse name: Not on file    Number of children: Not on file    Years of education: Not on file    Highest education level: Not on file   Social Needs    Financial resource strain: Not on file    Food insecurity - worry: Not on file    Food insecurity - inability: Not on file   Homeschool Snowboarding needs - medical: Not on file   Homeschool Snowboarding needs - non-medical: Not on file   Occupational History    Not on file   Tobacco Use    Smoking status: Never Smoker    Smokeless tobacco: Never Used   Substance and Sexual Activity    Alcohol use: Yes     Comment: occ    Drug use: No    Sexual activity: Not on file   Other Topics Concern    Not on file   Social History Narrative    Not on file       FamilyHx:  Family History   Problem Relation Age of Onset    Diabetes Mother     Hypertension Mother     Heart Disease Father     Stroke Father     Hypertension Sister     Diabetes Sister     Hypertension Brother        Prior to Admission Medications   Prescriptions Last Dose Informant Patient Reported? Taking? cyanocobalamin 1,000 mcg tablet 11/11/2018  Yes Yes   Sig: Take 1,000 mcg by mouth daily.    dexamethasone (DECADRON) 4 mg tablet 11/13/2018 at 1900  Yes Yes   Sig: Take 4 mg by mouth every twelve (12) hours. levETIRAcetam (KEPPRA) 750 mg tablet 11/13/2018 at 1900  Yes Yes   Sig: Take 750 mg by mouth two (2) times a day. levothyroxine (SYNTHROID) 100 mcg tablet 11/13/2018 at 0600  Yes Yes   Sig: Take  by mouth Daily (before breakfast). raNITIdine (ZANTAC) 150 mg tablet 11/13/2018 at 1900  Yes Yes   Sig: Take 150 mg by mouth two (2) times a day. Facility-Administered Medications: None       Review of Systems:  Unable to assess. S/p Surgery. Drowsy      Objective      Visit Vitals  BP (!) 159/97   Pulse 69   Temp 97.1 °F (36.2 °C)   Resp 16   Ht 6' (1.829 m)   Wt 119.3 kg (263 lb)   SpO2 98%   BMI 35.67 kg/m²       Physical Exam:  General Appearance:drowsy  HENT: dressing  Lungs: CTA with normal respiratory effort  CV: RRR, no m/r/g  Abdomen: soft, non-tender, normal bowel sounds  Extremities: no cyanosis, no peripheral edema  Neuro: drowsy  Psych:drowsy      Imaging Reviewed:  Mri Brain W Cont    Result Date: 11/14/2018  EXAM: MRI BRAIN W CONT CLINICAL INDICATION/HISTORY: Neoplasm - CNS primary   > Additional: Recent seizure, with reported inferior right frontal brain lesion COMPARISON: None. > Reference Exam: None. TECHNIQUE: Imaging performed on wide bore Discovery [id number] GEM suite 3T magnet at [facility]. Using Stealth protocol, axial 3-D imaging was performed with T2 and T1 post gadolinium sequences. _______________ FINDINGS: There is an intraparenchymal fairly smoothly marginated and slightly lobulated mass involving the inferior right frontal lobe with associated surrounding mass effect extending inferiorly adjacent to the cribriform plate on the right, extending down to the margin of the roof of the posterior right orbit, and extending posteriorly causing some mild bowing of the right proximal A1 and A2 and proximal right M1 segments of the cerebral arteries. There is also lobulated left-sided subfalcine extension 14 mm to the left of midline.  There is associated mass effect and effacement of the anterior right lateral ventricle. This lesion has primarily homogeneous mildly low T1 and mildly increased T2 signal with several internal foci of low T1 and increased T2 signal. There are also several small globular areas of associated enhancement involving the mid and inferior aspect of this lesion as well as more wispy enhancement involving the posterior superior aspect of this lesion. The maximal transverse dimensions of this lesion are 6.8 x 4.2 cm in greatest AP and transverse dimensions. No other distant intracranial parenchymal signal abnormality or enhancement. No hydrocephalus. Remainder intracranial flow voids unremarkable. _______________     IMPRESSION: 1. 6.8 cm diameter intraparenchymal inferior right frontal neoplasm with associated mass effect as described with 14 mm lobulated subfalcine extension to the left, and several internal areas of heterogeneous cystic appearance and several small associated areas of enhancement. 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 Assessment/Plan     Active Hospital Problems    Diagnosis Date Noted    Right frontal lobe mass 11/14/2018     Right frontal lobe mass  -image guided right craniotomy for resection of brain tumor  -defer to primary  -defer AED's to primary    Hypothyroidism  -TSH  -synthyroid    - Cont acceptable home medications for chronic conditions   - DVT protocol    I reviewed all available labs and imaging that were available prior to my encounter. We appreciate the consultation for medical management and appreciate being able to be involved with their care during hospitalization.       Faith Love, EARNEST-BC  [address]

## 2018-11-14 NOTE — PERIOP NOTES
1403 Pt received to PACU and connected to monitor. Bedside report given by Arianna Hunter RN. Vital signs stable. Nurse at bedside. Will continue to monitor. [address] Shiva Forrest at bedside. Pt follows commands and moves all extremities purposefully/spontaneously. [facility]   PACU Summary  Patient arrived to PACU at 1403  Bedside/Verbal report received from Gabby Duranad:    11/14/18 1419 11/14/18 1424 11/14/18 1429 11/14/18 1434   BP: 127/68 133/71 130/80 135/72   Pulse: (!) 56 72 60 67   Resp: 17 18 18 17   Temp:       SpO2: 98% 97% 99% 92%   Weight:       Height:         Cardiac rhythm: Normal Sinus Rhythm     Lines and Drains  Peripheral Intravenous Line:   Peripheral IV 11/14/18 Left Hand (Active)   Site Assessment Clean, dry, & intact 11/14/2018  2:03 PM   Phlebitis Assessment 0 11/14/2018  2:03 PM   Infiltration Assessment 0 11/14/2018  2:03 PM   Dressing Status Clean, dry, & intact 11/14/2018  2:03 PM   Dressing Type Transparent;Tape 11/14/2018  2:03 PM   Hub Color/Line Status Pink; Infusing 11/14/2018  2:03 PM   Action Taken Open ports on tubing capped 11/14/2018  2:03 PM   Alcohol Cap Used Yes 11/14/2018  2:03 PM       Peripheral IV 11/14/18 Right Hand (Active)   Site Assessment Clean, dry, & intact 11/14/2018  2:03 PM   Phlebitis Assessment 0 11/14/2018  2:03 PM   Infiltration Assessment 0 11/14/2018  2:03 PM   Dressing Status Clean, dry, & intact 11/14/2018  2:03 PM   Dressing Type Transparent;Tape 11/14/2018  2:03 PM   Hub Color/Line Status Green; Infusing 11/14/2018  2:03 PM   Action Taken Open ports on tubing capped 11/14/2018  2:03 PM   Alcohol Cap Used Yes 11/14/2018  2:03 PM   , Drain(s):   and Atrial Line:      Wound  Wound Head Right (Active)   DRESSING STATUS Clean, dry, and intact 11/14/2018  1:27 PM   DRESSING TYPE 4 x 4;Gauze wrap (erin); Xeroform 11/14/2018  1:27 PM   Number of days: 0        Intake and Output    Intake/Output Summary (Last 24 hours) at 11/14/2018 1504  Last data filed at 11/14/2018 1444  Gross per 24 hour   Intake 1875 ml   Output 1975 ml   Net -100 ml         Report called to [place], RN in 2600 at [phone]    Patient transported to Room 2606 at [address]

## 2018-11-15 ENCOUNTER — APPOINTMENT (OUTPATIENT)
Dept: MRI IMAGING | Age: 48
DRG: 027 | End: 2018-11-15
Attending: NEUROLOGICAL SURGERY
Payer: COMMERCIAL

## 2018-11-15 LAB
ANION GAP SERPL CALC-SCNC: 10 MMOL/L (ref 3–18)
BASOPHILS # BLD: 0 K/UL (ref 0–0.1)
BASOPHILS NFR BLD: 0 % (ref 0–3)
BUN SERPL-MCNC: 19 MG/DL (ref 7–18)
BUN/CREAT SERPL: 20 (ref 12–20)
CALCIUM SERPL-MCNC: 8.5 MG/DL (ref 8.5–10.1)
CHLORIDE SERPL-SCNC: 103 MMOL/L (ref 100–108)
CO2 SERPL-SCNC: 25 MMOL/L (ref 21–32)
CREAT SERPL-MCNC: 0.97 MG/DL (ref 0.6–1.3)
DIFFERENTIAL METHOD BLD: ABNORMAL
EOSINOPHIL # BLD: 0 K/UL (ref 0–0.4)
EOSINOPHIL NFR BLD: 0 % (ref 0–5)
ERYTHROCYTE [DISTWIDTH] IN BLOOD BY AUTOMATED COUNT: 15.4 % (ref 11.6–14.5)
GLUCOSE SERPL-MCNC: 165 MG/DL (ref 74–99)
HCT VFR BLD AUTO: 42.6 % (ref 35–45)
HGB BLD-MCNC: 14.1 G/DL (ref 12–16)
LYMPHOCYTES # BLD: 1.3 K/UL (ref 0.8–3.5)
LYMPHOCYTES NFR BLD: 6 % (ref 20–51)
MCH RBC QN AUTO: 29.3 PG (ref 24–34)
MCHC RBC AUTO-ENTMCNC: 33.1 G/DL (ref 31–37)
MCV RBC AUTO: 88.4 FL (ref 74–97)
MONOCYTES # BLD: 0.9 K/UL (ref 0–1)
MONOCYTES NFR BLD: 4 % (ref 2–9)
NEUTS SEG # BLD: 19.1 K/UL (ref 1.8–8)
NEUTS SEG NFR BLD: 90 % (ref 42–75)
PLATELET # BLD AUTO: 216 K/UL (ref 135–420)
PMV BLD AUTO: 9.7 FL (ref 9.2–11.8)
POTASSIUM SERPL-SCNC: 4.4 MMOL/L (ref 3.5–5.5)
RBC # BLD AUTO: 4.82 M/UL (ref 4.2–5.3)
RBC MORPH BLD: ABNORMAL
SODIUM SERPL-SCNC: 138 MMOL/L (ref 136–145)
WBC # BLD AUTO: 21.3 K/UL (ref 4.6–13.2)

## 2018-11-15 PROCEDURE — 70553 MRI BRAIN STEM W/O & W/DYE: CPT

## 2018-11-15 PROCEDURE — 85025 COMPLETE CBC W/AUTO DIFF WBC: CPT

## 2018-11-15 PROCEDURE — 36415 COLL VENOUS BLD VENIPUNCTURE: CPT

## 2018-11-15 PROCEDURE — 74011250637 HC RX REV CODE- 250/637: Performed by: NEUROLOGICAL SURGERY

## 2018-11-15 PROCEDURE — 74011250636 HC RX REV CODE- 250/636: Performed by: NEUROLOGICAL SURGERY

## 2018-11-15 PROCEDURE — 74011250636 HC RX REV CODE- 250/636: Performed by: NURSE PRACTITIONER

## 2018-11-15 PROCEDURE — 74011000250 HC RX REV CODE- 250: Performed by: NEUROLOGICAL SURGERY

## 2018-11-15 PROCEDURE — A9575 INJ GADOTERATE MEGLUMI 0.1ML: HCPCS | Performed by: NEUROLOGICAL SURGERY

## 2018-11-15 PROCEDURE — 65610000009 HC RM ICU NEURO

## 2018-11-15 PROCEDURE — 80048 BASIC METABOLIC PNL TOTAL CA: CPT

## 2018-11-15 RX ORDER — GADOTERATE MEGLUMINE 376.9 MG/ML
20 INJECTION INTRAVENOUS
Status: COMPLETED | OUTPATIENT
Start: 2018-11-15 | End: 2018-11-15

## 2018-11-15 RX ORDER — LEVETIRACETAM 750 MG/1
750 TABLET, EXTENDED RELEASE ORAL 2 TIMES DAILY
Status: DISCONTINUED | OUTPATIENT
Start: 2018-11-15 | End: 2018-11-15 | Stop reason: CLARIF

## 2018-11-15 RX ORDER — LABETALOL HCL 20 MG/4 ML
5 SYRINGE (ML) INTRAVENOUS
Status: DISCONTINUED | OUTPATIENT
Start: 2018-11-15 | End: 2018-11-18 | Stop reason: HOSPADM

## 2018-11-15 RX ORDER — DEXAMETHASONE SODIUM PHOSPHATE 4 MG/ML
4 INJECTION, SOLUTION INTRA-ARTICULAR; INTRALESIONAL; INTRAMUSCULAR; INTRAVENOUS; SOFT TISSUE EVERY 8 HOURS
Status: DISCONTINUED | OUTPATIENT
Start: 2018-11-15 | End: 2018-11-15 | Stop reason: SDUPTHER

## 2018-11-15 RX ORDER — DEXAMETHASONE 4 MG/1
4 TABLET ORAL EVERY 8 HOURS
Status: DISCONTINUED | OUTPATIENT
Start: 2018-11-15 | End: 2018-11-16

## 2018-11-15 RX ORDER — LABETALOL HCL 20 MG/4 ML
SYRINGE (ML) INTRAVENOUS
Status: DISPENSED
Start: 2018-11-15 | End: 2018-11-15

## 2018-11-15 RX ADMIN — LEVETIRACETAM 750 MG: 500 TABLET, FILM COATED ORAL at 21:45

## 2018-11-15 RX ADMIN — GADOTERATE MEGLUMINE 20 ML: 376.9 INJECTION INTRAVENOUS at 14:53

## 2018-11-15 RX ADMIN — LEVETIRACETAM 750 MG: 500 TABLET, FILM COATED ORAL at 10:02

## 2018-11-15 RX ADMIN — LEVOTHYROXINE SODIUM 200 MCG: 100 TABLET ORAL at 08:28

## 2018-11-15 RX ADMIN — RANITIDINE 150 MG: 150 TABLET ORAL at 18:19

## 2018-11-15 RX ADMIN — RANITIDINE 150 MG: 150 TABLET ORAL at 10:02

## 2018-11-15 RX ADMIN — DEXAMETHASONE SODIUM PHOSPHATE 4 MG: 4 INJECTION, SOLUTION INTRA-ARTICULAR; INTRALESIONAL; INTRAMUSCULAR; INTRAVENOUS; SOFT TISSUE at 14:00

## 2018-11-15 RX ADMIN — CEFAZOLIN 3 G: 1 INJECTION, POWDER, FOR SOLUTION INTRAMUSCULAR; INTRAVENOUS; PARENTERAL at 04:18

## 2018-11-15 RX ADMIN — DEXAMETHASONE 4 MG: 4 TABLET ORAL at 21:45

## 2018-11-15 RX ADMIN — DEXAMETHASONE SODIUM PHOSPHATE 4 MG: 4 INJECTION, SOLUTION INTRAMUSCULAR; INTRAVENOUS at 06:29

## 2018-11-15 RX ADMIN — LABETALOL 20 MG/4 ML (5 MG/ML) INTRAVENOUS SYRINGE 5 MG: at 10:19

## 2018-11-15 RX ADMIN — OXYCODONE AND ACETAMINOPHEN 1 TABLET: 5; 325 TABLET ORAL at 04:17

## 2018-11-15 RX ADMIN — NICARDIPINE HYDROCHLORIDE 3 MG/HR: 2.5 INJECTION INTRAVENOUS at 01:19

## 2018-11-15 RX ADMIN — ONDANSETRON 4 MG: 2 INJECTION INTRAMUSCULAR; INTRAVENOUS at 04:49

## 2018-11-15 NOTE — PROGRESS NOTES
(7280) TRANSFER - IN REPORT:    Verbal report received from [address] Dakota Natarajan RN(name) on Kelby Daly  being received from PACU(unit) for routine post - op      Report consisted of patients Situation, Background, Assessment and   Recommendations(SBAR). Information from the following report(s) SBAR, OR Summary, Intake/Output, MAR, Recent Results and Alarm Parameters  was reviewed with the receiving nurse. Opportunity for questions and clarification was provided. Assessment completed upon patients arrival to unit and care assumed. ([phone]) Cardene restarted at 2.5mg/hr for SBP >140. (1930) Bedside and Verbal shift change report given to Rosey Cardoso RN (oncoming nurse) by Jamarcus Adame RN (offgoing nurse). Report included the following information SBAR, Intake/Output, MAR, Recent Results, Cardiac Rhythm NSR and Alarm Parameters .

## 2018-11-15 NOTE — PROGRESS NOTES
Assumed care from [address]. Patient is awake and alert, vomiting with headache. Zofran and dilaudid given as ordered. Will continue to monitor. 0000> No changes noted  0630> A-line and abernathy removed as ordered. 0730> Bedside and Verbal shift change report given to Sim Watson RN (oncoming nurse) by Oumar Sen RN (offgoing nurse). Report included the following information SBAR, Kardex, Intake/Output, MAR, Recent Results and Cardiac Rhythm nsr.

## 2018-11-15 NOTE — PROGRESS NOTES
Reason for Admission:  Right inferior frontal brain tumor :image guided right craniotomy for resection of brain tumor                   RRAT Score:  5                   Plan for utilizing home health:   As indicated                       Likelihood of Readmission:  mod                         Transition of Care Plan:    SNF vs [place]  Interviewed patient, she agrees to share her discharge information with her spouse, Zahira Thorpe . Demographic information verified. She was independent prior to admission and sees doctor at [facility] for her primary care needs. The patient states she does not anticipate needing rehab and declined SNF list at this time. I explained that once doctors allow her OOB therapy will evaluate and make recommendations. I explained that case management will follow and be available to assit with discharge needs. Care Management Interventions  PCP Verified by CM: Yes  Palliative Care Criteria Met (RRAT>21 & CHF Dx)?: No  Mode of Transport at Discharge:  Other (see comment)(spouse)  Transition of Care Consult (CM Consult): Discharge Planning  MyChart Signup: No  Discharge Durable Medical Equipment: No  Health Maintenance Reviewed: Yes  Physical Therapy Consult: No  Occupational Therapy Consult: No  Speech Therapy Consult: No  Current Support Network: Lives with Spouse  Confirm Follow Up Transport: Family  Plan discussed with Pt/Family/Caregiver: Yes  [place] Resource Information Provided?: No  Discharge Location  Discharge Placement: Other:(HH vs SNF vs ARU)

## 2018-11-15 NOTE — PROGRESS NOTES
Problem: Discharge Planning  Goal: *Discharge to safe environment  Outcome: Progressing Towards Goal  Plan is to discharge to a safe environrment

## 2018-11-15 NOTE — PROGRESS NOTES
Problem: Falls - Risk of  Goal: *Absence of Falls  Document Migel Fall Risk and appropriate interventions in the flowsheet.   Outcome: Progressing Towards Goal  Fall Risk Interventions:  Mobility Interventions: Bed/chair exit alarm         Medication Interventions: Bed/chair exit alarm, Evaluate medications/consider consulting pharmacy    Elimination Interventions: Bed/chair exit alarm, Call light in reach

## 2018-11-15 NOTE — PROGRESS NOTES
Physical Exam   Skin: Skin is warm and dry.         Primary Nurse Angeles Barnes RN and Rosie Georges RN performed a dual skin assessment on this patient Impairment noted- see wound doc flow sheet  Nagi score is 21

## 2018-11-15 NOTE — PROGRESS NOTES
Problem: Falls - Risk of  Goal: *Absence of Falls  Document Migel Fall Risk and appropriate interventions in the flowsheet.   Outcome: Progressing Towards Goal  Fall Risk Interventions:  Mobility Interventions: Bed/chair exit alarm         Medication Interventions: Bed/chair exit alarm    Elimination Interventions: Bed/chair exit alarm, Call light in reach, Patient to call for help with toileting needs, Toileting schedule/hourly rounds

## 2018-11-15 NOTE — PROGRESS NOTES
[facility]  Hospitalist Division           Inpatient Daily Progress Note        Patient: Lela Conklin MRN: [id number]  CSN: [id number]    YOB: 1970  Age: 53 y.o. Sex: female    DOA: 11/14/2018 LOS:  LOS: 1 day                      Interval History:    HPI: Lela Conklin is a 53 y.o. female with a PMHx of who we were consulted for medical management while undergoing image guided right craniotomy with excisional biopsy and near total removal of tumor. 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 11/15/18: POD 1. Cardene gtt discontinued per primary-prn labetalol added per primary team, baseline postop MRI today (results below), mobilization in ICU-await path results (per primary). Subjective:      NAD. Mild h/a     Objective:      Visit Vitals  /76   Pulse 80   Temp 99.1 °F (37.3 °C)   Resp 17   Ht 6' (1.829 m)   Wt 119.3 kg (263 lb)   SpO2 96%   BMI 35.67 kg/m²           Physical Exam:  General appearance: alert, cooperative  Head: Right eye swelling, dressing intact   Lungs: clear to auscultation bilaterally  Heart: regular rate and rhythm, S1, S2 normal   Abdomen: soft, non tender, non distended. Normoactive bowel sounds. Extremities: extremities normal, atraumatic, no cyanosis or edema  Skin: Skin color, texture, turgor normal. Dressing intact s/p right crani   Neurologic: Grossly normal; strength 5/5 ; follows commands, AOx4      Intake and Output:  Current Shift:  No intake/output data recorded.   Last three shifts:  11/13 1901 - 11/15 0700  In: 3302.3 [I.V.:3302.3]  Out: 4190 [Urine:4090]    Recent Results (from the past 24 hour(s))   CBC WITH AUTOMATED DIFF    Collection Time: 11/15/18  6:45 AM   Result Value Ref Range    WBC 21.3 (H) 4.6 - 13.2 K/uL    RBC 4.82 4.20 - 5.30 M/uL    HGB 14.1 12.0 - 16.0 g/dL    HCT 42.6 35.0 - 45.0 %    MCV 88.4 74.0 - 97.0 FL    MCH 29.3 24.0 - 34.0 PG    MCHC 33.1 31.0 - 37.0 g/dL    RDW 15.4 (H) 11.6 - 14.5 %    PLATELET 053 336 - 327 K/uL    MPV 9.7 9.2 - 11.8 FL    NEUTROPHILS PENDING %    LYMPHOCYTES PENDING %    MONOCYTES PENDING %    EOSINOPHILS PENDING %    BASOPHILS PENDING %    ABS. NEUTROPHILS PENDING K/UL    ABS. LYMPHOCYTES PENDING K/UL    ABS. MONOCYTES PENDING K/UL    ABS. EOSINOPHILS PENDING K/UL    ABS. BASOPHILS PENDING K/UL    DF PENDING    METABOLIC PANEL, BASIC    Collection Time: 11/15/18  6:45 AM   Result Value Ref Range    Sodium 138 136 - 145 mmol/L    Potassium 4.4 3.5 - 5.5 mmol/L    Chloride 103 100 - 108 mmol/L    CO2 25 21 - 32 mmol/L    Anion gap 10 3.0 - 18 mmol/L    Glucose 165 (H) 74 - 99 mg/dL    BUN 19 (H) 7.0 - 18 MG/DL    Creatinine 0.97 0.6 - 1.3 MG/DL    BUN/Creatinine ratio 20 12 - 20      GFR est AA >60 >60 ml/min/1.73m2    GFR est non-AA >60 >60 ml/min/1.73m2    Calcium 8.5 8.5 - 10.1 MG/DL           Lab Results   Component Value Date/Time    Glucose 165 (H) 11/15/2018 06:45 AM    Glucose 137 (H) 11/06/2018 11:41 AM    EXAM: MRI BRAIN W CONT     CLINICAL INDICATION/HISTORY: Neoplasm - CNS primary    > Additional: Recent seizure, with reported inferior right frontal brain  lesion     COMPARISON: None. > Reference Exam: None.     TECHNIQUE: Imaging performed on wide bore Discovery SP280w GEM suite 3T magnet  at [facility]". Using Stealth protocol, axial 3-D imaging was  performed with T2 and T1 post gadolinium sequences.     _______________     FINDINGS:     There is an intraparenchymal fairly smoothly marginated and slightly lobulated  mass involving the inferior right frontal lobe with associated surrounding mass  effect extending inferiorly adjacent to the cribriform plate on the right,  extending down to the margin of the roof of the posterior right orbit, and  extending posteriorly causing some mild bowing of the right proximal A1 and A2  and proximal right M1 segments of the cerebral arteries.  There is also lobulated  left-sided subfalcine extension 14 mm to the left of midline. There is  associated mass effect and effacement of the anterior right lateral ventricle. This lesion has primarily homogeneous mildly low T1 and mildly increased T2  signal with several internal foci of low T1 and increased T2 signal. There are  also several small globular areas of associated enhancement involving the mid  and inferior aspect of this lesion as well as more wispy enhancement involving  the posterior superior aspect of this lesion.     The maximal transverse dimensions of this lesion are 6.8 x 4.2 cm in greatest AP  and transverse dimensions.     No other distant intracranial parenchymal signal abnormality or enhancement. No  hydrocephalus. Remainder intracranial flow voids unremarkable.     _______________     IMPRESSION  IMPRESSION:     1. 6.8 cm diameter intraparenchymal inferior right frontal neoplasm with  associated mass effect as described with 14 mm lobulated subfalcine extension to  the left, and several internal areas of heterogeneous cystic appearance and  several small associated areas of enhancement. FINDINGS most likely related to  glioma, with enhancing components suggesting areas of higher grade.     Assessment/Plan:     Patient Active Problem List   Diagnosis Code    Kidney stone N20.0    Hypothyroidism E03.9    Right frontal lobe mass G93.9       A/P:    Right frontal lobe mass  -s/p image guided right craniotomy with excisional biopsy and near total removal of tumor  -frozen path consistent with low grade glioma   -defer to primary  -defer AED's to primary  -MRI baseline postop today  -mobilization in [address] [place] [address] path   -off cardene gtt (prns added)      Hypothyroidism  -TSH  -synthyroid     - Cont acceptable home medications for chronic conditions   - DVT protocol     [facility]  UYRLB:[id number]  Office:  [phone]

## 2018-11-15 NOTE — DIABETES MGMT
NUTRITIONAL ASSESSMENT GLYCEMIC CONTROL/ PLAN OF CARE     Veronique Bergeron           53 y.o.           11/14/2018               No diagnosis found. INTERVENTIONS/PLAN:   Will monitor po intake, labs and weights. ASSESSMENT:   Pt is 164% ideal wt; pt appears well nourished and is tolerating po. Nutrition Diagnoses:   Obesity due to excess energy intake PTA as evidenced by BMI of 35.7 kg/m2. SUBJECTIVE/OBJECTIVE:   Information obtained from: chart review, pt  Pt is s/p image guided right craniotomy for resection of brain tumor 11/14/18.  11/15/18: pt reports her appetite is good. She denies having food allergies and does not have issues with chewing or swallowing. Diet: regular    No data found. Medications: [x]                Reviewed   Pertinent:  Decadron taper    Most Recent POC Glucose:   Recent Labs     11/15/18  0645   *         Labs:   No results found for: HBA1C, HGBE8, [id number]  Lab Results   Component Value Date/Time    Sodium 138 11/15/2018 06:45 AM    Potassium 4.4 11/15/2018 06:45 AM    Chloride 103 11/15/2018 06:45 AM    CO2 25 11/15/2018 06:45 AM    Anion gap 10 11/15/2018 06:45 AM    Glucose 165 (H) 11/15/2018 06:45 AM    BUN 19 (H) 11/15/2018 06:45 AM    Creatinine 0.97 11/15/2018 06:45 AM    Calcium 8.5 11/15/2018 06:45 AM    Albumin 3.9 11/06/2018 11:41 AM       Anthropometrics: IBW : 72.6 kg (160 lb), % IBW (Calculated): 164.38 %, BMI (calculated): 35.7  Wt Readings from Last 1 Encounters:   11/14/18 119.3 kg (263 lb)      Ht Readings from Last 1 Encounters:   11/14/18 6' (1.829 m)       Estimated Nutrition Needs:  2329 Kcals/day, Protein (g): 87 g Fluid (ml): 2300 ml  Based on:   [x]          Actual BW    []          ABW   []            Adjusted BW        Nutrition Interventions:  None at this time  Goal:   Blood glucose will be within target range of  mg/dL by 11/18/18. Pt will consume > 75% meals by 11/20/18.         Nutrition Monitoring and Evaluation      [x] Monitor po intake on meal rounds  [x]     Continue inpatient monitoring and intervention  []     Other:      Nutrition Risk:  []   High     [x]  Moderate    []  Minimal/Uncompromised    Adelina Napier RD, CDE   Office:  [address] [facility] Pager:  [phone]

## 2018-11-15 NOTE — OP NOTES
[facility]  OPERATIVE REPORT    Name:Nelly URBINA  MR#: [id number]  : 1970  ACCOUNT #: [de-identified]   DATE OF SERVICE: 2018    POSTOPERATIVE DIAGNOSIS:  Right inferior frontal lobe tumor. POSTOPERATIVE DIAGNOSIS:  Right inferior frontal lobe tumor. Frozen path consistent with low-grade glioma. PROCEDURE PERFORMED:  MRI Stealth assisted right frontal craniotomy with excisional biopsy and near total removal of tumor. SURGEON:  Noel Santos MD    ASSISTANT:  Ben Glez and Kaity Walters. ANESTHESIA:  General.    ESTIMATED BLOOD LOSS:  100. FLUID REPLACEMENT:  800 mL of crystalloid. URINE OUTPUT:  1500 mL. SPECIMENS REMOVED:  Permanent, frozen x2, both consistent with low-grade astrocytoma. DRAINS:  None. COMPLICATIONS:  none      IMPLANTS:  none     PROCEDURE:  The patient is a very nice 50-year-old female who presents with a solitary seizure. She was traveling in [place] when this occurred. She subsequently was sent home. She was seen by her internist and a CT scan and MRIs were performed. These studies showed evidence of a fairly large neoplasm involving the inferior aspect of the right frontal lobe in the region of the gyrus rectus, pushing back toward the optic chiasm. The patient remained neurologically intact and she had no further seizures on medication. Different treatment and evaluation options were discussed with the patient, including simply monitoring this, needle biopsy and excisional biopsy, and I felt in this circumstance given the size of this tumor that excisional biopsy was the most reasonable course of action. This tumor was not homogeneous. There were different areas that looked more aggressive than others on the MRI scan. This patient was brought into the hospital and prepared and brought to the operating room, where she was anesthetized and intubated.   Huff catheter and Tristen stockings were applied and maintained for the surgical procedure. The patient had undergone an MRI Stealth protocol scan prior to her anesthesia. Patient was placed in the 3-pin fixation device Seibert headholder. The patient was registered and this was registered with the Stealth scanner. Multiple registrations were done until we had when that was suitable for our surgical needs. We had turned the table 180 degrees from anesthesia. Patient's head was in the [place]. The patient's malar eminence was the largest point in the field. Right side of the scalp was shaved and an incision, which followed the hairline was done. The skin incision was infiltrated with Xylocaine with epinephrine. Utilizing a 10 blade, the incision was opened. Pinky clips were applied to the wound edges. The temporalis muscle was divided in the direction of the skin incision. We put 3 bur holes into the skull. One was at the keyhole, the second was in the temporal squama approximately 3 cm back from the keyhole, and the third was placed in the region of the coronal suture, just in front of it. A craniotome was utilized and a free flap was raised. The dura was quite attenuated frontally, which required repair at the end. Dural tenting stitches were placed around the perimeter of the wound in anticipation of closure. The dura was opened in a horseshoe type fashion with the base of the horseshoe frontally. We had reached the very floor of the anterior fossa with our initial bone flap. At this point, under loupe magnification, I was able to gently mobilize the frontal lobe. According to the MRI scan, there was a distinct beginning of the tumor, which was just medial to the midportion of the orbit and indeed, this is what was found grossly. Utilizing the Stealth, this correlated exactly with the Stealth pictures. The bipolar cautery was then utilized to develop a plane working lateral to medially.   Utilizing the suction and the bipolar, for the most part, there was a very nice plane between what appeared to be the tumor and normal brain. This lateral to medial type of dissection was done. The olfactory nerve was identified and I followed it distally until it crossed the optic nerve. A small piece of Gelfoam in thrombin was placed over the optic nerve to protect it. Utilizing the [place], we gradually debulked this tumor and removed. Once removed, the tumor was sent for specimen. We had 2 different frozen specimens from different parts of the tumor, both of which came back consistent with low-grade glioma. The tumor bed was closely inspected and it was all very homogeneous at the end and fit very well with the perimeters and parameters of the Stealth scanner. At this juncture, hemostasis was achieved with bipolar. We lined the cavity with Surgicel. A dural graft was utilized to close the dura. Tenting stitches had been placed previously. A central dural tack-up stitch was placed. The patient had the bone plate resecured utilizing the CranioFix system. The temporalis muscle was closed with interrupted 2-0 Vicryl. The galea was closed with 2-0 Vicryl and skin was closed with locking nylon 3-0. Subsequently, the patient had a dressing placed when she was removed from the [place] headholder and was allowed to awaken, extubated without trouble and taken to the recovery room tolerating her procedure well.       MD MICA Troncoso / EVELIN  D: 11/14/2018 14:35     T: 11/15/2018 09:04  JOB #: [id number]

## 2018-11-15 NOTE — PROGRESS NOTES
Neurosurgery Progress Note; post op day one  S: doing very  Well no seizures minimal headache. O: on cardene blood pressure is less then 874 systolic. Alert oriented x 3 right eye is slightly swollen. Dressing is intact. A; doing well frozen consistent with low grade glioma.   Probably will not need ancillary treatment at this point  Will get baseline postop mri today given nature or tumor there will be some residual.   P; convert meds to oral decadron and keppra, advance activity  Mobilize in the icu await pathology

## 2018-11-15 NOTE — PROGRESS NOTES
0700- Bedside shift change report given to Yonathan Jovel RN (oncoming nurse) by Altagracia Mehta RN (offgoing nurse). Report included the following information SBAR, OR Summary, Intake/Output, MAR, Recent Results, Cardiac Rhythm NSR BP goal <140  and Quality Measures. Pt resting comfortably. Denies pain or headache. Will continue to monitor. 0830- Pt had post cath void of approx. 100cc. Huff catheter was removed at approx. 0630.     1400- Pt off the floor for MRI. 1530- Pt returned from MRI. Vitals stable, pt complaint of feeling hot and nauseous during MRI. Paused MRI, cooled patient down and completed imaging. 1800- Pt resting comfortably. Will continue to monitor. 1915- Bedside shift change report given to JUANITA Soliman (oncoming nurse) by Yonathan Jovel RN (offgoing nurse). Report included the following information SBAR, OR Summary, Procedure Summary, Intake/Output, Recent Results, Cardiac Rhythm NSR BP systolic goal of <620  and Quality Measures.

## 2018-11-16 PROCEDURE — 77030032490 HC SLV COMPR SCD KNE COVD -B

## 2018-11-16 PROCEDURE — 65270000029 HC RM PRIVATE

## 2018-11-16 PROCEDURE — 74011250637 HC RX REV CODE- 250/637: Performed by: NEUROLOGICAL SURGERY

## 2018-11-16 PROCEDURE — 74011250636 HC RX REV CODE- 250/636: Performed by: NEUROLOGICAL SURGERY

## 2018-11-16 PROCEDURE — 97116 GAIT TRAINING THERAPY: CPT

## 2018-11-16 PROCEDURE — 97162 PT EVAL MOD COMPLEX 30 MIN: CPT

## 2018-11-16 RX ORDER — LANOLIN ALCOHOL/MO/W.PET/CERES
1000 CREAM (GRAM) TOPICAL DAILY
Status: DISCONTINUED | OUTPATIENT
Start: 2018-11-16 | End: 2018-11-18 | Stop reason: HOSPADM

## 2018-11-16 RX ORDER — DEXAMETHASONE 4 MG/1
4 TABLET ORAL EVERY 12 HOURS
Status: DISCONTINUED | OUTPATIENT
Start: 2018-11-16 | End: 2018-11-18 | Stop reason: HOSPADM

## 2018-11-16 RX ADMIN — DEXAMETHASONE 4 MG: 4 TABLET ORAL at 20:46

## 2018-11-16 RX ADMIN — RANITIDINE 150 MG: 150 TABLET ORAL at 08:49

## 2018-11-16 RX ADMIN — DEXAMETHASONE 4 MG: 4 TABLET ORAL at 08:49

## 2018-11-16 RX ADMIN — RANITIDINE 150 MG: 150 TABLET ORAL at 17:23

## 2018-11-16 RX ADMIN — LEVOTHYROXINE SODIUM 200 MCG: 100 TABLET ORAL at 06:23

## 2018-11-16 RX ADMIN — OXYCODONE AND ACETAMINOPHEN 1 TABLET: 5; 325 TABLET ORAL at 13:39

## 2018-11-16 RX ADMIN — LEVETIRACETAM 750 MG: 500 TABLET, FILM COATED ORAL at 20:46

## 2018-11-16 RX ADMIN — LEVETIRACETAM 750 MG: 500 TABLET, FILM COATED ORAL at 08:49

## 2018-11-16 RX ADMIN — CYANOCOBALAMIN TAB 1000 MCG 1000 MCG: 1000 TAB at 12:28

## 2018-11-16 NOTE — PROGRESS NOTES
Physical Exam   Skin: Skin is warm, dry and intact. Dual skin assessment completed with JUANITA Soliman.    0730 Alert,oriented x 4. Pupils are equal and reactive. With rt periorbital swelling and ecchymosis. Moves all extremities. Upper extremities are equal in strength. 1100 TRANSFER - OUT REPORT:    Verbal report given to hospitals RN(name) on Kelby Daly  being transferred to 221(unit) for routine progression of care       Report consisted of patients Situation, Background, Assessment and   Recommendations(SBAR). Information from the following report(s) SBAR, Kardex, Intake/Output, MAR and Recent Results was reviewed with the receiving nurse.     Lines:   Peripheral IV 11/14/18 Left Hand (Active)   Site Assessment Clean, dry, & intact 11/16/2018 10:06 AM   Phlebitis Assessment 0 11/16/2018 10:06 AM   Infiltration Assessment 0 11/16/2018 10:06 AM   Dressing Status Clean, dry, & intact 11/16/2018 10:06 AM   Dressing Type Transparent 11/16/2018  8:00 AM   Hub Color/Line Status Pink;Capped 11/16/2018  8:00 AM   Action Taken Open ports on tubing capped 11/16/2018  8:00 AM   Alcohol Cap Used Yes 11/16/2018  8:00 AM       Peripheral IV 11/14/18 Right Hand (Active)   Site Assessment Clean, dry, & intact 11/16/2018 10:06 AM   Phlebitis Assessment 0 11/16/2018 10:06 AM   Infiltration Assessment 0 11/16/2018 10:06 AM   Dressing Status Clean, dry, & intact 11/16/2018 10:06 AM   Dressing Type Transparent 11/16/2018  8:00 AM   Hub Color/Line Status Green;Capped 11/16/2018  8:00 AM   Action Taken Open ports on tubing capped 11/16/2018  8:00 AM   Alcohol Cap Used Yes 11/16/2018  8:00 AM       Peripheral IV 11/14/18 Anterior;Right Wrist (Active)   Site Assessment Clean, dry, & intact 11/16/2018 10:06 AM   Phlebitis Assessment 0 11/16/2018 10:06 AM   Infiltration Assessment 0 11/16/2018 10:06 AM   Dressing Status Clean, dry, & intact 11/16/2018 10:06 AM   Dressing Type Transparent 11/16/2018  8:00 AM   Hub Color/Line Status Capped 11/16/2018  8:00 AM   Action Taken Open ports on tubing capped 11/16/2018  8:00 AM   Alcohol Cap Used Yes 11/16/2018  8:00 AM        Opportunity for questions and clarification was provided. Patient transported with:   Registered Nurse    Pt is received in the floor room 1571 by John Pedersen RN.   Dual Nu[place] checks done

## 2018-11-16 NOTE — PROGRESS NOTES
Problem: Mobility Impaired (Adult and Pediatric)  Goal: *Acute Goals and Plan of Care (Insert Text)  Physical Therapy Goals  Initiated 11/16/2018 and to be accomplished within 7 day(s)  1. Patient will move from supine to sit and sit to supine  in bed with independence. 2.  Patient will transfer from bed to chair and chair to bed with independence using the least restrictive device. 3.  Patient will perform sit to stand with independence. 4.  Patient will ambulate with independence for 200 feet with the least restrictive device. 5.  Patient will ascend/descend 4 stairs with handrail(s) with independence. Outcome: Progressing Towards Goal  PHYSICAL THERAPY: Initial Assessment   INPATIENT: [employer]: Hospital Day: 3     Patient: Maerk Tucker (89 y.o. female)    Date: 11/16/2018  Primary Diagnosis: right inferior frontal brain tumor  Right frontal lobe mass   Procedure(s) (LRB):  image guided right craniotomy for resection of brain tumor (Right), 2 Days Post-Op   Precautions: Fall, Seizure  FWB   PLOF: independent    ASSESSMENT :  Patient L S/L in bed upon meeting, cooperative to PT.  BP R arm: 138/74. Alert and followed commands throughout eval.  Supervision to come to sitting at EOB, good static and dynamic sitting balance during testing. Supervision for sit to stand and supervision to ambulate 15 feet around bed to transfer to chair. Good static and dynamic standing balance during ambulation. Educated patient on benefit of changing positions during stay. Patient left in chair with all needs within reach. BP R arm: 138/98. RN Moni aware. Patient presents with deficits in:  Bed Mobility, Transfers, Gait, Range of Motion, Balance, Stairs, Precautions and Skin Integrity/Hygiene    Patient will benefit from skilled intervention to address the above impairments.   Patients rehabilitation potential is considered to be Good  Factors which may influence rehabilitation potential include:   [] None noted  []         Mental ability/status  [x]         Medical condition  []         Home/family situation and support systems  []         Safety awareness  []         Pain tolerance/management  []         Other:      PLAN :  Recommendations and Planned Interventions:  [x]           Bed Mobility Training             [x]    Neuromuscular Re-Education  [x]           Transfer Training                   []    Orthotic/Prosthetic Training  [x]           Gait Training                          [x]    Modalities  [x]           Therapeutic Exercises          []    Edema Management/Control  [x]           Therapeutic Activities            [x]    Patient and Family Training/Education  []           Other (comment):      EDUCATION:   Education:  Patient was educated on the following topics: Bed mobility, transfers, ADLs, balance, amb, safety, exercise, role of PT, plan of care, cognition, skin integrity, vitals        Barriers to Learning/Limitations: None  Compensate with: visual, verbal, tactile, kinesthetic cues/model    Recommendations for the next treatment session: gait training, stairs training  Frequency/Duration: Patient will be followed by physical therapy 4-7 times a week to address goals. Discharge Recommendations: Home Health  Further Equipment Recommendations for Discharge: N/A  Factors which may impact discharge planning: stairs     SUBJECTIVE:   Patient stated I feel fine.     OBJECTIVE DATA SUMMARY:     Past Medical History:   Diagnosis Date    Hiatal hernia     Hypothyroidism     Kidney stone     Nausea & vomiting     Seizures ([facility] [address] 10/17/2018    X1     Past Surgical History:   Procedure Laterality Date    BREAST SURGERY PROCEDURE UNLISTED      HX BREAST LUMPECTOMY Right 2015    HX CHOLECYSTECTOMY      HX OTHER SURGICAL  2007    tubal pregnancy        Eval Complexity: History: MEDIUM  Complexity : 1-2 comorbidities / personal factors will impact the outcome/ POC Exam:MEDIUM Complexity : 3 Standardized tests and measures addressing body structure, function, activity limitation and / or participation in recreation  Presentation: MEDIUM Complexity : Evolving with changing characteristics  Clinical Decision Making:Medium Complexity [facility] Standing Balance Scale Overall Complexity:MEDIUM    G CODES:Mobility [id number] Current  CJ= 20-39%   Goal  CH= 0%. The severity rating is based on the Other [address] Standing Balance Scale 4/5  0: Pt performs 25% or less of standing activity (Max assist) CN, 100% impaired. 1: Pt supports self with upper extremities but requires therapist assistance. Pt performs 25-50% of effort (Mod assist) CM, 80% to <100% impaired. 1+: Pt supports self with upper extremities but requires therapist assistance. Pt performs >50% effort. (Min assist). CL, 60% to <80% impaired. 2: Pt supports self independently with both upper extremities (walker, crutches, parallel bars). CL, 60% to <80% impaired. 2+: Pt support self independently with 1 upper extremity (cane, crutch, 1 parallel bar). CK, 40% to <60% impaired. 3: Pt stands without upper extremity support for up to 30 seconds. CK, 40% to <60% impaired. 3+: Pt stands without upper extremity support for 30 seconds or greater. CJ, 20% to <40% impaired. 4: Pt independently moves and returns center of gravity 1-2 inches in one plane. CJ, 20% to <40% impaired. 4+: Pt independently moves and returns center of gravity 1-2 inches in multiple planes. CI, 1% to <20% impaired. 5: Pt independently moves and returns center of gravity in all planes greater than 2 inches. CH, 0% impaired.     Prior Level of Function/Home Situation:   Home Situation  Home Environment: Private residence  # Steps to Enter: 4  Rails to Enter: Yes  Hand Rails : Bilateral  One/Two Story Residence: One story  Living Alone: No  Support Systems: Spouse/Significant Other/Partner  Patient Expects to be Discharged to[de-identified] Private residence  Current DME Used/Available at Home: None  Critical Behavior:  Neurologic State: Alert  Orientation Level: Oriented X4  Cognition: Follows commands; Appropriate decision making; Appropriate safety awareness  Safety/Judgement: Awareness of environment  Psychosocial  Patient Behaviors: Calm; Cooperative        Manual Muscle Testing (LE)         R     L    Hip Flexion:   5/5 5/5  Knee EXT:   5/5 5/5  Knee FLEX:   5/5 5/5  Ankle DF:   5/5 5/5  _________________________________________________   Tone : WFL  Sensation: WFL  Range Of Motion: WFL    Functional Mobility:      Functional Status      Indep   (I)   Mod I   Super-vision   Min A   Mod A   Max A   Total A   Assist x2 Verbal cues Additional time Not tested   Comments   Rolling []  []  [] []    []    []  []  [] [] [] [x]    Supine to sit []  []  [x] []  []  []  []  [] [] [] []    Sit to supine []  []  [] []  []  []  []  [] [] [] [x]    Sit to stand []  []  [x] []  []  []  []  [] [] [] []    Stand to sit []  []  [x] []  []  []  []  [] [] [] []    Bed to chair transfers []  []  [x] []  []  []  []  [] [] [] []        Balance    Good   Fair   Poor   Unable   Not tested   Comments   Sitting static [x]  []  []  []  []    Sitting dynamic [x]  []  []  []  []    Standing static [x]  []  []  []  []    Standing dynamic [x]  []  []  []  []        Mobility/Gait:   Level of Assistance: Supervision  Assistive Device: None  Distance Ambulated: 15 feet     Left Lower Extremity: FWB  Right Lower Extremity: FWB  Base of Support: WFL  Speed/Estefani: WFL  Step Length: WFL  Swing Pattern: WFL  Stance: WFL  Gait Abnormalities: WFL    Pain:  Pre treatment pain level: 0/10  Post treatment pain level: 0/10    Activity Tolerance:   Good    Please refer to the flowsheet for vital signs taken during this treatment.   After treatment:   [x]         Patient left in no apparent distress sitting up in chair  []         Patient left in no apparent distress in bed  [x]         Call bell left within reach  [x]         Nursing notified  []         Caregiver present  []         Bed alarm activated    COMMUNICATION/EDUCATION:   [x]         Fall prevention education was provided and the patient/caregiver indicated understanding. [x]         Patient/family have participated as able in goal setting and plan of care. [x]         Patient/family agree to work toward stated goals and plan of care. []         Patient understands intent and goals of therapy, but is neutral about his/her participation. []         Patient is unable to participate in goal setting and plan of care.     Thank you for this referral.  Jennifer Wesley, SPT   Time Calculation: 16 mins

## 2018-11-16 NOTE — PROGRESS NOTES
1140 TRANSFER - IN REPORT:    Verbal report received from  [facility] (name) on Kelby Daly  being received from icu (unit) for routine progression of care      Report consisted of patients Situation, Background, Assessment and   Recommendations(SBAR). Information from the following report(s) SBAR, Kardex, ED Summary, OR Summary, Procedure Summary, Intake/Output, MAR, Recent Results and Med Rec Status was reviewed with the receiving nurse. Opportunity for questions and clarification was provided. Assessment completed upon patients arrival to unit and care assumed. 1145 Bedside and Verbal  report given to [address]  (oncoming nurse) by Yessi Rainey   (offgoing nurse). Report included the following information SBAR, Kardex, ED Summary, OR Summary, Procedure Summary, Intake/Output, MAR, Recent Results and Med Rec Status.

## 2018-11-16 NOTE — PROGRESS NOTES
Patient is resting comfortably; A&Ox4. AVSS, NAD noted at this time. Full assessment into epic. Patient updated on current POC- verbalized understanding. All safety precautions in place- safety maintained. Will continue to monitor.

## 2018-11-16 NOTE — PROGRESS NOTES
Neurosurgery Progress Note;  Post op day 2 post op baseline mri reviewed  Path is pending. S: doing well  O:   No seizures soreness on the left with eating. Oriented x 3 non focal examination afebrile and stable vital signs white count is up as a stress response.    A; doing well   p; mobilize today up ad irina to floor

## 2018-11-16 NOTE — PROGRESS NOTES
Physical Exam   Skin: Skin is warm, dry and intact. 2-person skin assessment completed by Le Gibson RN.

## 2018-11-16 NOTE — ROUTINE PROCESS
Bedside and Verbal shift change report given to Moni (oncoming nurse) by Janny (offgoing nurse). Report included the following information SBAR, Kardex, ED Summary, OR Summary, Procedure Summary, Intake/Output, MAR, Accordion, Recent Results, Med Rec Status, Cardiac Rhythm SB, Alarm Parameters  and Quality Measures. Patient has orders to transfer to floor this morning.

## 2018-11-16 NOTE — PROGRESS NOTES
[facility]  Hospitalist Division           Inpatient Daily Progress Note        Patient: Emmy Pratt MRN: [id number]  CSN: [id number]    YOB: 1970  Age: 53 y.o. Sex: female    DOA: 11/14/2018 LOS:  LOS: 2 days                      Interval History:    HPI: Emmy Pratt is a 53 y.o. female with a PMHx of who we were consulted for medical management while undergoing image guided right craniotomy with excisional biopsy and near total removal of tumor. 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 11/15/18: POD 1. Cardene gtt discontinued per primary-prn labetalol added per primary team, baseline postop MRI today (results below), mobilization in ICU-await path results (per primary). 11/16/18: Transfer to floor. PT. BP/pain control. Further recommendations per primary. Subjective:      NAD. Mild h/a; swelling R eye     Objective:      Visit Vitals  /73   Pulse (!) 54   Temp 98.2 °F (36.8 °C)   Resp 17   Ht 6' (1.829 m)   Wt 119.3 kg (263 lb)   SpO2 95%   BMI 35.67 kg/m²           Physical Exam:  General appearance: alert, cooperative  Head: Right eye swelling, dressing intact   Lungs: clear to auscultation bilaterally  Heart: regular rate and rhythm, S1, S2 normal   Abdomen: soft, non tender, non distended. Normoactive bowel sounds. Extremities: extremities normal, atraumatic, no cyanosis or edema  Skin: Skin color, texture, turgor normal. Dressing intact s/p right crani   Neurologic: Grossly normal; strength 5/5 ; follows commands, AOx4      Intake and Output:  Current Shift:  No intake/output data recorded. Last three shifts:  11/14 1901 - 11/16 0700  In: 1663.5 [P.O.:272; I.V.:1391.5]  Out: 1983 [Urine:2615]    No results found for this or any previous visit (from the past 24 hour(s)).         Lab Results   Component Value Date/Time    Glucose 165 (H) 11/15/2018 06:45 AM    Glucose 137 (H) 11/06/2018 11:41 AM    EXAM: MRI BRAIN W CONT     CLINICAL INDICATION/HISTORY: Neoplasm - CNS primary    > Additional: Recent seizure, with reported inferior right frontal brain  lesion     COMPARISON: None. > Reference Exam: None.     TECHNIQUE: Imaging performed on wide bore Discovery [id number] [place] 3T magnet  at [facility]. Using Stealth protocol, axial 3-D imaging was  performed with T2 and T1 post gadolinium sequences.     _______________     FINDINGS:     There is an intraparenchymal fairly smoothly marginated and slightly lobulated  mass involving the inferior right frontal lobe with associated surrounding mass  effect extending inferiorly adjacent to the cribriform plate on the right,  extending down to the margin of the roof of the posterior right orbit, and  extending posteriorly causing some mild bowing of the right proximal A1 and A2  and proximal right M1 segments of the cerebral arteries. There is also lobulated  left-sided subfalcine extension 14 mm to the left of midline. There is  associated mass effect and effacement of the anterior right lateral ventricle. This lesion has primarily homogeneous mildly low T1 and mildly increased T2  signal with several internal foci of low T1 and increased T2 signal. There are  also several small globular areas of associated enhancement involving the mid  and inferior aspect of this lesion as well as more wispy enhancement involving  the posterior superior aspect of this lesion.     The maximal transverse dimensions of this lesion are 6.8 x 4.2 cm in greatest AP  and transverse dimensions.     No other distant intracranial parenchymal signal abnormality or enhancement. No  hydrocephalus.  Remainder intracranial flow voids unremarkable.     _______________     IMPRESSION  IMPRESSION:     1. 6.8 cm diameter intraparenchymal inferior right frontal neoplasm with  associated mass effect as described with 14 mm lobulated subfalcine extension to  the left, and several internal areas of heterogeneous cystic appearance and  several small associated areas of enhancement. FINDINGS most likely related to  glioma, with enhancing components suggesting areas of higher grade.     Assessment/Plan:     Patient Active Problem List   Diagnosis Code    Kidney stone N20.0    Hypothyroidism E03.9    Right frontal lobe mass G93.9       A/P:    Right frontal lobe mass  -s/p image guided right craniotomy with excisional biopsy and near total removal of tumor  -frozen path consistent with low grade glioma   -defer to primary  -defer AED's to primary  -MRI baseline postop today  -off cardene gtt (prns added)   -transfer to floor 11/16/18     Swelling R eye  -ice packs prn     Leukocytosis  2/2 steroid response    Hypothyroidism  -TSH  -synthyroid     - Cont acceptable home medications for chronic conditions   - DVT protocol     [facility]  KLW:[phone]  Office:  [phone]

## 2018-11-17 PROBLEM — R56.9 SEIZURES (HCC): Status: ACTIVE | Noted: 2018-10-17

## 2018-11-17 PROCEDURE — 97110 THERAPEUTIC EXERCISES: CPT

## 2018-11-17 PROCEDURE — 74011250637 HC RX REV CODE- 250/637: Performed by: NEUROLOGICAL SURGERY

## 2018-11-17 PROCEDURE — 65270000029 HC RM PRIVATE

## 2018-11-17 PROCEDURE — 74011250636 HC RX REV CODE- 250/636: Performed by: NEUROLOGICAL SURGERY

## 2018-11-17 PROCEDURE — 97116 GAIT TRAINING THERAPY: CPT

## 2018-11-17 RX ORDER — MAGNESIUM CITRATE
296 SOLUTION, ORAL ORAL
Status: COMPLETED | OUTPATIENT
Start: 2018-11-17 | End: 2018-11-17

## 2018-11-17 RX ADMIN — LEVOTHYROXINE SODIUM 200 MCG: 100 TABLET ORAL at 06:00

## 2018-11-17 RX ADMIN — ACETAMINOPHEN 650 MG: 325 TABLET, FILM COATED ORAL at 17:18

## 2018-11-17 RX ADMIN — RANITIDINE 150 MG: 150 TABLET ORAL at 17:18

## 2018-11-17 RX ADMIN — DEXAMETHASONE 4 MG: 4 TABLET ORAL at 20:26

## 2018-11-17 RX ADMIN — RANITIDINE 150 MG: 150 TABLET ORAL at 08:10

## 2018-11-17 RX ADMIN — MAGESIUM CITRATE 296 ML: 1.75 LIQUID ORAL at 13:21

## 2018-11-17 RX ADMIN — LEVETIRACETAM 750 MG: 500 TABLET, FILM COATED ORAL at 08:10

## 2018-11-17 RX ADMIN — CYANOCOBALAMIN TAB 1000 MCG 1000 MCG: 1000 TAB at 08:10

## 2018-11-17 RX ADMIN — LEVETIRACETAM 750 MG: 500 TABLET, FILM COATED ORAL at 20:26

## 2018-11-17 RX ADMIN — ACETAMINOPHEN 650 MG: 325 TABLET, FILM COATED ORAL at 11:23

## 2018-11-17 RX ADMIN — ACETAMINOPHEN 650 MG: 325 TABLET, FILM COATED ORAL at 05:03

## 2018-11-17 RX ADMIN — DEXAMETHASONE 4 MG: 4 TABLET ORAL at 08:10

## 2018-11-17 NOTE — PROGRESS NOTES
Problem: Falls - Risk of  Goal: *Absence of Falls  Document Migel Fall Risk and appropriate interventions in the flowsheet.   Outcome: Progressing Towards Goal  Fall Risk Interventions:  Mobility Interventions: Patient to call before getting OOB, Utilize walker, cane, or other assistive device, Utilize gait belt for transfers/ambulation         Medication Interventions: Patient to call before getting OOB, Teach patient to arise slowly    Elimination Interventions: Call light in reach, Patient to call for help with toileting needs, Toilet paper/wipes in reach

## 2018-11-17 NOTE — ROUTINE PROCESS
Bedside and Verbal shift change report given to JUANITA Washington (oncoming nurse) by Danish Vazquez (offgoing nurse). Report included the following information SBAR, Kardex, MAR and Recent Results.

## 2018-11-17 NOTE — PROGRESS NOTES
Internal Medicine Progress Note    Patient's Name: Bonnie Pagan Date: 11/14/2018  Length of Stay: 3      Assessment/Plan     Active Hospital Problems    Diagnosis Date Noted    Right frontal lobe mass 11/14/2018    Seizures ([address] 10/17/2018     X1      Hypothyroidism      - Diet and mobilization per primary team  - Pain control PRN  - PT/OT  - Initial gross assumption is low grade glioma, awaiting final path  - BP in good range  - Cont synthroid  - Stool softeners/lax PRN  - Cont acceptable home medications for chronic conditions   - DVT protocol    I have personally reviewed all pertinent labs and films that have officially resulted over the last 24 hours. I have personally checked for all pending labs that are awaiting final results. Subjective     Pt s/e @ bedside  No major events overnight  Pt offers no complaints this AM other than mild bruising on R eye  Constipated  Denies CP or SOB    Objective     Visit Vitals  /88 (BP 1 Location: Right arm, BP Patient Position: Sitting)   Pulse 69   Temp 97.5 °F (36.4 °C)   Resp 16   Ht 6' (1.829 m)   Wt 119.3 kg (263 lb)   SpO2 95%   BMI 35.67 kg/m²       Physical Exam:  General Appearance: NAD, conversant, head cap on, wound clean  HEENT: mild ecchymoses R orbit greatly improved since yest  Lungs: CTA with normal respiratory effort  CV: RRR, no m/r/g  Abdomen: soft, non-tender, normal bowel sounds  Extremities: no cyanosis, no peripheral edema  Neuro: No focal deficits, motor/sensory intact    Lab/Data Reviewed:  BMP: No results found for: NA, K, CL, CO2, AGAP, GLU, BUN, CREA, GFRAA, GFRNA  CBC: No results found for: WBC, HGB, HGBEXT, HCT, HCTEXT, PLT, PLTEXT, HGBEXT, HCTEXT, PLTEXT    Imaging Reviewed:  No results found.     Medications Reviewed:  Current Facility-Administered Medications   Medication Dose Route Frequency    dexamethasone (DECADRON) tablet 4 mg  4 mg Oral Q12H    cyanocobalamin tablet 1,000 mcg  1,000 mcg Oral DAILY    levETIRAcetam (KEPPRA) tablet 750 mg  750 mg Oral BID    labetalol (NORMODYNE;TRANDATE) 5 mg/mL injection 5 mg  5 mg IntraVENous Q2H PRN    raNITIdine (ZANTAC) tablet 150 mg  150 mg Oral BID    ondansetron (ZOFRAN) injection 4 mg  4 mg IntraVENous Q4H PRN    acetaminophen (TYLENOL) tablet 650 mg  650 mg Oral Q6H PRN    oxyCODONE-acetaminophen (PERCOCET) 5-325 mg per tablet 1 Tab  1 Tab Oral Q6H PRN    HYDROmorphone (PF) (DILAUDID) injection 1 mg  1 mg IntraVENous Q1H PRN    levothyroxine (SYNTHROID) tablet 200 mcg  200 mcg Oral ACB    influenza vaccine 2018-19 (6 mos+)(PF) (FLUARIX QUAD/FLULAVAL QUAD) injection 0.5 mL  0.5 mL IntraMUSCular PRIOR TO DISCHARGE           Ilia Edwards DO  Internal Medicine, Hospitalist  Pager: [phone]  [address] [place] [address]

## 2018-11-17 NOTE — PROGRESS NOTES
1936: Received patient in bed awake,alert and oriented x4. No signs of distress. Denies any pain at this time. Call bell within reach. Will monitor. 0009: Assisted patient to the bathroom,back in bed without any problems,denies any pain,given ice water,valuables within reach. Will continue monitoring. 0600: In bed resting quietly,uneventful night,no other concerns at this time,call bell within reach. Will continue monitoring.

## 2018-11-17 NOTE — PROGRESS NOTES
S: awake and alert and minimal to no headache  O: afeb, incision healing well, neuro intact, path pending  A: doing well  P: mobilize and home in 1-2 days.

## 2018-11-17 NOTE — PROGRESS NOTES
Problem: Falls - Risk of  Goal: *Absence of Falls  Document Migel Fall Risk and appropriate interventions in the flowsheet. Outcome: Progressing Towards Goal  Fall Risk Interventions:  Mobility Interventions: Patient to call before getting OOB         Medication Interventions: Patient to call before getting OOB, Teach patient to arise slowly    Elimination Interventions: Patient to call for help with toileting needs             Problem: Pressure Injury - Risk of  Goal: *Prevention of pressure injury  Document Nagi Scale and appropriate interventions in the flowsheet.   Outcome: Progressing Towards Goal  Pressure Injury Interventions:       Moisture Interventions: Absorbent underpads, Apply protective barrier, creams and emollients    Activity Interventions: Increase time out of bed    Mobility Interventions: PT/OT evaluation    Nutrition Interventions: Document food/fluid/supplement intake

## 2018-11-17 NOTE — ROUTINE PROCESS
2582- Bedside and Verbal shift change report received from BLADE Vo RN. Report included the following information SBAR, Kardex and MAR. Pt lying in bed resting quietly, no c/o distress or discomfort at this time. 1439- Pt ambulated w/ PT, pt has no c/o distress or discomfort. 1122- Pt given Tylenol for discomfort at surgical site. 1528- Pt sleeping, spouse at bedside. 1719- Pt given Tylenol. 1818- Pt verbalizes relief from pain. 1905- Bedside and Verbal shift change report given to BLADE Paul RN and ILENE Myers RN (oncoming nurse) by CHINA Cates (offgoing nurse). Report included the following information SBAR, Kardex and MAR.

## 2018-11-17 NOTE — PROGRESS NOTES
Problem: Mobility Impaired (Adult and Pediatric)  Goal: *Acute Goals and Plan of Care (Insert Text)  Physical Therapy Goals  Initiated 11/16/2018 and to be accomplished within 7 day(s)  1. Patient will move from supine to sit and sit to supine  in bed with independence. 2.  Patient will transfer from bed to chair and chair to bed with independence using the least restrictive device. 3.  Patient will perform sit to stand with independence. 4.  Patient will ambulate with independence for 200 feet with the least restrictive device. 5.  Patient will ascend/descend 4 stairs with handrail(s) with independence. Outcome: Progressing Towards Goal    PHYSICAL THERAPY: Daily TREATMENT Note   INPATIENT: [employer]: Hospital Day: 4     Patient: Ahsan Steiner (82 y.o. 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 PLOF: Independent    ASSESSMENT:  Patient supine in bed, agreeable to participation with PT. Pt demonstrated modified I for all transfers and functional mobility. Patient modified independent with ambulation x 350 ft without AD. Demonstrated intermittent path deviations including veering to the left with ambulation indicating slight balance impairments with gait training. Required intermittent standing rest breaks during ambulation without complaints of sx. Demonstrates good static/dynamic standing balance while donning and doffing robe. Participation in ~8min of standing CINTHIA LE strengthening therex, w/ VCing for quad engagement and proper body mechanics with standing hip ABD and flexion. Patient left supine in bed with all needs within reach. Patient has no c/o pain.  Patient educated on safety with mobility and standing/seated exercises. Verbalizes understanding. Patient progressing well, plan to see for 1 - 2 more visits. Progression toward goals:        Improving appropriately and progressing toward goals     PLAN:  Patient continues to benefit from skilled intervention to address the above impairments. Continue treatment per established plan of care. EDUCATION:   Education: Patient was educated on the following topics: Safety and proper mechanics with bed mobility, transfers, ADLs, balance, amb, exercise, role of PT, plan of care, cognition, skin integrity, vitals       Barriers to Learning/Limitations: None  Compensate with: visual, verbal, tactile, kinesthetic cues/model    Discharge Recommendations:  Home Health  Further Equipment Recommendations for Discharge:  N/A  Factors which may impact discharge planning: medical condition, stairs     SUBJECTIVE:   Patient stated I'm feeling ok, no dizziness with I stand up.     OBJECTIVE DATA SUMMARY:   Critical Behavior:  Neurologic State: Alert, Appropriate for age  Orientation Level: Oriented X4  Cognition: Appropriate decision making  Safety/Judgement: Awareness of environment    G CODE: Mobility [id number] Current  CI= 1-19%   Goal  CH= 0%. The severity rating is based on the [place] [id number] [facility] 151 Standing Balance Scale  0: Pt performs 25% or less of standing activity (Max assist) CN, 100% impaired. 1: Pt supports self with upper extremities but requires therapist assistance. Pt performs 25-50% of effort (Mod assist) CM, 80% to <100% impaired. 1+: Pt supports self with upper extremities but requires therapist assistance. Pt performs >50% effort. (Min assist). CL, 60% to <80% impaired. 2: Pt supports self independently with both upper extremities (walker, crutches, parallel bars). CL, 60% to <80% impaired. 2+: Pt support self independently with 1 upper extremity (cane, crutch, 1 parallel bar). CK, 40% to <60% impaired.   3: Pt stands without upper extremity support for up to 30 seconds. CK, 40% to <60% impaired. 3+: Pt stands without upper extremity support for 30 seconds or greater. CJ, 20% to <40% impaired. 4: Pt independently moves and returns center of gravity 1-2 inches in one plane. CJ, 20% to <40% impaired. 4+: Pt independently moves and returns center of gravity 1-2 inches in multiple planes. CI, 1% to <20% impaired. 5: Pt independently moves and returns center of gravity in all planes greater than 2 inches. CH, 0% impaired.       Functional Mobility:      Functional Status      Indep   (I)   Mod I   Super-vision   Min A   Mod A   Max A   Total A   Assist x2 Verbal cues Additional time Not tested   Comments   Rolling []  []  [] []    []    []  []  [] [] [] [x]    Supine to sit []  [x]  [] []  []  []  []  [] [] [] []    Sit to supine []  [x]  [] []  []  []  []  [] [] [] []    Sit to stand []  [x]  [] []  []  []  []  [] [] [] []    Stand to sit []  [x]  [] []  []  []  []  [] [] [] []    Bed to chair transfers []  []  [] []  []  []  []  [] [] [] [x]        Balance    Good   Fair   Poor   Unable   Not tested   Comments   Sitting static [x]  []  []  []  []    Sitting dynamic [x]  []  []  []  []    Standing static [x]  []  []  []  []    Standing dynamic [x]  []  []  []  []      Mobility/Gait:   Level of Assistance: Supervision  Assistive Device: None  Distance Ambulated: 350 feet     Left Lower Extremity: FWB  Right Lower Extremity: FWB  Base of Support: WFL  Speed/Estefani: slow  Step Length: WFL  Swing Pattern: left symmetrical and right symmetrical  Stance: WFL  Gait Abnormalities: path deviations    Therapeutic Exercises:        EXERCISE   Sets   Reps   Active Active Assist   Passive Self ROM   Comments   Ankle Pumps    [] [] [] []    Quad Sets/Glut Sets   [] [] [] []    Hamstring Sets   [] [] [] []    Short Arc Quads   [] [] [] []    Heel Slides   [] [] [] []    Stand Hip flex/ABD x10  [] [] [] [] RW for safety   Hip Abd/Add   [] [] [] []    Long Arc Quads   [] [] [] []    Seated Marching   [] [] [] []    Standing Marching x10  [] [] [] [] RW for safety      [] [] [] []        Vital Signs  Temp: 97.5 °F (36.4 °C)     Pulse (Heart Rate): 69     BP: 133/88     Resp Rate: 16     O2 Sat (%): 95 %  Pain:  Pre treatment pain level: 0/10  Post treatment pain level:0/10  Pain Scale 1: Numeric (0 - 10)  Pain Intensity 1: 1  Pain Location 1: Head  Pain Orientation 1: Right  Pain Description 1: Aching;Constant  Pain Intervention(s) 1: Medication (see MAR)  Activity Tolerance: Good     After treatment:  Patient left in no apparent distress in bed  Call bell left within reach  Nursing notified - Mainor Hassan   Time Calculation: 25 mins

## 2018-11-17 NOTE — PROGRESS NOTES
Problem: Pressure Injury - Risk of  Goal: *Prevention of pressure injury  Document Nagi Scale and appropriate interventions in the flowsheet.   Outcome: Progressing Towards Goal  Pressure Injury Interventions:       Moisture Interventions: Absorbent underpads, Apply protective barrier, creams and emollients    Activity Interventions: Increase time out of bed, Pressure redistribution bed/mattress(bed type), PT/OT evaluation    Mobility Interventions: HOB 30 degrees or less, Pressure redistribution bed/mattress (bed type), PT/OT evaluation    Nutrition Interventions: Document food/fluid/supplement intake

## 2018-11-18 VITALS
HEIGHT: 72 IN | BODY MASS INDEX: 35.62 KG/M2 | SYSTOLIC BLOOD PRESSURE: 130 MMHG | RESPIRATION RATE: 16 BRPM | OXYGEN SATURATION: 97 % | DIASTOLIC BLOOD PRESSURE: 88 MMHG | TEMPERATURE: 98.6 F | HEART RATE: 69 BPM | WEIGHT: 263 LBS

## 2018-11-18 PROCEDURE — 74011250636 HC RX REV CODE- 250/636: Performed by: NEUROLOGICAL SURGERY

## 2018-11-18 PROCEDURE — 74011250637 HC RX REV CODE- 250/637: Performed by: NEUROLOGICAL SURGERY

## 2018-11-18 PROCEDURE — 97110 THERAPEUTIC EXERCISES: CPT

## 2018-11-18 PROCEDURE — 97116 GAIT TRAINING THERAPY: CPT

## 2018-11-18 RX ORDER — DEXAMETHASONE 4 MG/1
4 TABLET ORAL 2 TIMES DAILY WITH MEALS
Qty: 10 TAB | Refills: 0 | Status: SHIPPED | OUTPATIENT
Start: 2018-11-18 | End: 2022-04-19

## 2018-11-18 RX ORDER — LEVETIRACETAM 750 MG/1
750 TABLET ORAL 2 TIMES DAILY
Qty: 60 TAB | Refills: 1 | Status: SHIPPED | OUTPATIENT
Start: 2018-11-18

## 2018-11-18 RX ORDER — OXYCODONE AND ACETAMINOPHEN 5; 325 MG/1; MG/1
1 TABLET ORAL
Qty: 40 TAB | Refills: 0 | Status: SHIPPED | OUTPATIENT
Start: 2018-11-18 | End: 2022-07-19

## 2018-11-18 RX ADMIN — LEVOTHYROXINE SODIUM 200 MCG: 100 TABLET ORAL at 05:28

## 2018-11-18 RX ADMIN — CYANOCOBALAMIN TAB 1000 MCG 1000 MCG: 1000 TAB at 08:35

## 2018-11-18 RX ADMIN — DEXAMETHASONE 4 MG: 4 TABLET ORAL at 08:34

## 2018-11-18 RX ADMIN — LEVETIRACETAM 750 MG: 500 TABLET, FILM COATED ORAL at 08:34

## 2018-11-18 RX ADMIN — RANITIDINE 150 MG: 150 TABLET ORAL at 08:34

## 2018-11-18 RX ADMIN — ACETAMINOPHEN 650 MG: 325 TABLET, FILM COATED ORAL at 08:34

## 2018-11-18 NOTE — DISCHARGE SUMMARY
D/C Summary  DOA-11/14 DOD 11/18  Admission Dx- Brain tumor  D/C Dx-s/p craniotomy with resection of tumor  Comp-none  Dispo to home  HPI- 53 y.o. Female who presented with seizure and found to have brain tumor and admitted for craniotomy  PMH-non-contributory  PE- General and neuro exam intact  Hosp course-admitted and taken to OR where surgery without difficulty. Post-op remained neuro intact and mobilized and by fourth hospital day ambulatory and neuro intact and incision healing well and now to home.  Path pending  Instructions- keep incision clean and dry and ambulate to tolerance and watch for infection and f/u in 12 days for suture removal.

## 2018-11-18 NOTE — PROGRESS NOTES
0800  Received pt on bed, on seizure precaution, bed padded, instructed her to call for getting OOB  verbalized understanding. left side of the face is slightly  Puffy, per pt she was been on that side the whole day  Yesterday. Below right eye is swollen but  From the surgery, so far no dizziness and no headache, can see  With out problem. 1000  Walks to the bathroom, steady on her feet, walk with PT in the hallway with out problem. 1330  Refused  Flu vaccine, incision looks clean all stitches intact, discharge instructions given and verbalized understanding,   Present , no pain discharge home.

## 2018-11-18 NOTE — DISCHARGE INSTRUCTIONS
Seizure: Care Instructions  Your Care Instructions    Seizures are caused by abnormal patterns of electrical signals in the brain. They are different for each person. Seizures can affect movement, speech, vision, or awareness. Some people have only slight shaking of a hand and do not pass out. Other people may pass out and have violent shaking of the whole body. Some people appear to stare into space. They are awake, but they can't respond normally. Later, they may not remember what happened. You may need tests to identify the type and cause of the seizures. A seizure may occur only once, or you may have them more than one time. Taking medicines as directed and following up with your doctor may help keep you from having more seizures. The doctor has checked you carefully, but problems can develop later. If you notice any problems or new symptoms, get medical treatment right away. Follow-up care is a key part of your treatment and safety. Be sure to make and go to all appointments, and call your doctor if you are having problems. It's also a good idea to know your test results and keep a list of the medicines you take. How can you care for yourself at home? · Be safe with medicines. Take your medicines exactly as prescribed. Call your doctor if you think you are having a problem with your medicine. · Do not do any activity that could be dangerous to you or others until your doctor says it is safe to do so. For example, do not drive a car, operate machinery, swim, or climb ladders. · Be sure that anyone treating you for any health problem knows that you have had a seizure and what medicines you are taking for it. · Identify and avoid things that may make you more likely to have a seizure. These may include lack of sleep, alcohol or drug use, stress, or not eating. · Make sure you go to your follow-up appointment. When should you call for help? Call 911 anytime you think you may need emergency care. For example, call if:    · You have another seizure.     · You have more than one seizure in 24 hours.     · You have new symptoms, such as trouble walking, speaking, or thinking clearly.    Call your doctor now or seek immediate medical care if:    · You are not acting normally.    Watch closely for changes in your health, and be sure to contact your doctor if you have any problems. Where can you learn more? Go to http://carlos-juan manuel.info/. Enter T944 in the search box to learn more about \"Seizure: Care Instructions. \"  Current as of: June 4, 2018  Content Version: 11.8  © 1633-8782 [employer]. Care instructions adapted under license by [employer] (which disclaims liability or warranty for this information). If you have questions about a medical condition or this instruction, always ask your healthcare professional. [address] any warranty or liability for your use of this information. DISCHARGE SUMMARY from Nurse    PATIENT INSTRUCTIONS:    After general anesthesia or intravenous sedation, for 24 hours or while taking prescription Narcotics:  · Limit your activities  · Do not drive and operate hazardous machinery  · Do not make important personal or business decisions  · Do  not drink alcoholic beverages  · If you have not urinated within 8 hours after discharge, please contact your surgeon on call.     Report the following to your surgeon:  · Excessive pain, swelling, redness or odor of or around the surgical area  · Temperature over 100.5  · Nausea and vomiting lasting longer than 4 hours or if unable to take medications  · Any signs of decreased circulation or nerve impairment to extremity: change in color, persistent  numbness, tingling, coldness or increase pain  · Any questions    What to do at Home:  Recommended activity: Activity as tolerated and no driving for today and Ambulate in house,     If you experience any of the following symptoms  Like increasing pain, please follow up with  Dr glover    *  Please give a list of your current medications to your Primary Care Provider. *  Please update this list whenever your medications are discontinued, doses are      changed, or new medications (including over-the-counter products) are added. *  Please carry medication information at all times in case of emergency situations. These are general instructions for a healthy lifestyle:    No smoking/ No tobacco products/ Avoid exposure to second hand smoke  Surgeon General's Warning:  Quitting smoking now greatly reduces serious risk to your health. Obesity, smoking, and sedentary lifestyle greatly increases your risk for illness    A healthy diet, regular physical exercise & weight monitoring are important for maintaining a healthy lifestyle    You may be retaining fluid if you have a history of heart failure or if you experience any of the following symptoms:  Weight gain of 3 pounds or more overnight or 5 pounds in a week, increased swelling in our hands or feet or shortness of breath while lying flat in bed. Please call your doctor as soon as you notice any of these symptoms; do not wait until your next office visit. Recognize signs and symptoms of STROKE:    F-face looks uneven    A-arms unable to move or move unevenly    S-speech slurred or non-existent    T-time-call 911 as soon as signs and symptoms begin-DO NOT go       Back to bed or wait to see if you get better-TIME IS BRAIN. Warning Signs of HEART ATTACK     Call 911 if you have these symptoms:   Chest discomfort. Most heart attacks involve discomfort in the center of the chest that lasts more than a few minutes, or that goes away and comes back. It can feel like uncomfortable pressure, squeezing, fullness, or pain.  Discomfort in other areas of the upper body. Symptoms can include pain or discomfort in one or both arms, the back, neck, jaw, or stomach.    Shortness of breath with or without chest discomfort.  Other signs may include breaking out in a cold sweat, nausea, or lightheadedness. Don't wait more than five minutes to call 911 - MINUTES MATTER! Fast action can save your life. Calling 911 is almost always the fastest way to get lifesaving treatment. Emergency Medical Services staff can begin treatment when they arrive -- up to an hour sooner than if someone gets to the hospital by car. Patient armband removed and shredded  MyChart Activation    Thank you for requesting access to [employer]. Please follow the instructions below to securely access and download your online medical record. [employer] allows you to send messages to your doctor, view your test results, renew your prescriptions, schedule appointments, and more. How Do I Sign Up? 1. In your internet browser, go to [email or web address]  2. Click on the First Time User? Click Here link in the Sign In box. You will be redirect to the New Member Sign Up page. 3. Enter your [employer] Access Code exactly as it appears below. You will not need to use this code after youve completed the sign-up process. If you do not sign up before the expiration date, you must request a new code. [employer] Access Code: Activation code not generated  Current [employer] Status: Active (This is the date your [employer] access code will )    4. Enter the last four digits of your Social Security Number (xxxx) and Date of Birth (mm/dd/yyyy) as indicated and click Submit. You will be taken to the next sign-up page. 5. Create a [employer] ID. This will be your [employer] login ID and cannot be changed, so think of one that is secure and easy to remember. 6. Create a [employer] password. You can change your password at any time. 7. Enter your Password Reset Question and Answer. This can be used at a later time if you forget your password. 8. Enter your e-mail address.  You will receive e-mail notification when new information is available in [employer]. 9. Click Sign Up. You can now view and download portions of your medical record. 10. Click the Download Summary menu link to download a portable copy of your medical information. Additional Information    If you have questions, please visit the Frequently Asked Questions section of the [employer] website at [email or web address] Remember, [employer] is NOT to be used for urgent needs. For medical emergencies, dial 911. The discharge information has been reviewed with the patient and spouse. The patient and spouse verbalized understanding. Discharge medications reviewed with the patient and spouse and appropriate educational materials and side effects teaching were provided.   ___________________________________________________________________________________________________________________________________

## 2018-11-18 NOTE — PROGRESS NOTES
Problem: Mobility Impaired (Adult and Pediatric)  Goal: *Acute Goals and Plan of Care (Insert Text)  Physical Therapy Goals  Initiated 11/16/2018 and to be accomplished within 7 day(s)  1. Patient will move from supine to sit and sit to supine  in bed with independence. 2.  Patient will transfer from bed to chair and chair to bed with independence using the least restrictive device. 3.  Patient will perform sit to stand with independence. 4.  Patient will ambulate with independence for 200 feet with the least restrictive device. 5.  Patient will ascend/descend 4 stairs with handrail(s) with independence. PHYSICAL THERAPY: Daily TREATMENT Note   INPATIENT: [employer]: Hospital Day: 5     Patient: Cullen Gustafson (89 y.o. 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 PLOF:Independent    ASSESSMENT:  Up in chair on initial contact. Modified [place] with transfers and gait with no assistive device on level surface and up and down 5 steps with 1 rail. No lost of balance noted during gait. Will continue to follow until goals stated on initial assessment met. Progression toward goals:        Improving appropriately and progressing toward goals             PLAN:  Patient continues to benefit from skilled intervention to address the above impairments. Continue treatment per established plan of care. EDUCATION:   Education:  Patient was educated on the following topics: safety and proper mechanics with transfers and gait   Barriers to Learning/Limitations: None  Compensate with: visual, verbal, tactile, kinesthetic cues/model    Discharge Recommendations:  ?  Home Health  Further Equipment Recommendations for Discharge: N/A  Factors which may impact discharge planning: medical condition     SUBJECTIVE:   Patient stated I'm feeling better.     OBJECTIVE DATA SUMMARY:   Critical Behavior:  Neurologic State: Alert  Orientation Level: Oriented X4  Cognition: Appropriate decision making  Safety/Judgement: Awareness of environment    G CODE:Mobility  Current  CI= 1-19%   Goal  CH= 0%. The severity rating is based on the [place] [address] Standing Balance Scale  0: Pt performs 25% or less of standing activity (Max assist) CN, 100% impaired. 1: Pt supports self with upper extremities but requires therapist assistance. Pt performs 25-50% of effort (Mod assist) CM, 80% to <100% impaired. 1+: Pt supports self with upper extremities but requires therapist assistance. Pt performs >50% effort. (Min assist). CL, 60% to <80% impaired. 2: Pt supports self independently with both upper extremities (walker, crutches, parallel bars). CL, 60% to <80% impaired. 2+: Pt support self independently with 1 upper extremity (cane, crutch, 1 parallel bar). CK, 40% to <60% impaired. 3: Pt stands without upper extremity support for up to 30 seconds. CK, 40% to <60% impaired. 3+: Pt stands without upper extremity support for 30 seconds or greater. CJ, 20% to <40% impaired. 4: Pt independently moves and returns center of gravity 1-2 inches in one plane. CJ, 20% to <40% impaired. 4+: Pt independently moves and returns center of gravity 1-2 inches in multiple planes. CI, 1% to <20% impaired. 5: Pt independently moves and returns center of gravity in all planes greater than 2 inches. CH, 0% impaired.       Functional Mobility:      Functional Status      Indep   (I)   Mod I   Super-vision   Min A   Mod A   Max A   Total A   Assist x2 Verbal cues Additional time Not tested   Comments   Rolling []  []  [] []    []    []  []  [] [] [] [x]    Supine to sit []  []  [] []  []  []  []  [] [] [] [x]    Sit to supine []  []  [] []  []  []  []  [] [] [] [x]    Sit to stand []  []  [] []  []  []  []  [] [] [] [x]    Stand to sit []  []  [] []  []  []  []  [] [] [] [x]    Bed to chair transfers []  []  [] []  []  []  []  [] [] [] [x]        Balance    Good   Fair   Poor   Unable   Not tested   Comments   Sitting static [x]  []  []  []  []    Sitting dynamic [x]  []  []  []  []    Standing static [x]  []  []  []  []    Standing dynamic [x]  []  []  []  []      Mobility/Gait:   Level of Assistance: Modified independent  Assistive Device: none  Distance Ambulated: 400 feet     Left Lower Extremity: FWB  Right Lower Extremity: FWB  Base of Support: WFL  Speed/Estefani: slow  Step Length: WFL  Swing Pattern: left symmetrical and right symmetrical  Stance: WFL  Gait Abnormalities: path deviations    Stairs:   Level of Assistance: Modified independent  Assistive Device: None  Rail Use: right  Number of Stairs: 5    Therapeutic Exercises:       EXERCISE   Sets   Reps   Active Active Assist   Passive Self ROM   Comments   Ankle Pumps    [] [] [] []    Quad Sets/Glut Sets   [] [] [] []    Hamstring Sets   [] [] [] []    Short Arc Quads   [] [] [] []    Heel Slides   [] [] [] []    Straight Leg Raises   [] [] [] []    Hip Abd/Add 1 30 [x] [] [] [] Both LE   Long Arc Quads   [] [] [] []    Seated Marching   [] [] [] []    Standing Marching 1 30 [] [] [] [] Both LE      [] [] [] []      Vital Signs  Temp: 98.6 °F (37 °C)     Pulse (Heart Rate): 69     BP: 130/88     Resp Rate: 16     O2 Sat (%): 97 %     Pain:  Pre treatment pain level:  0  Post treatment pain level: 0  Pain Scale 1: Numeric (0 - 10)  Pain Intensity 1: 0      Activity Tolerance:  Good     After treatment:   Patient left in no apparent distress sitting up in chair  Call bell left within reach  Nursing notified    Recommendations for nursing:  Written on communication board: OOB with assist of 1  Verbally communicated to: nurse Daniela Alston, PT   Time Calculation: 24 mins

## 2018-11-18 NOTE — PROGRESS NOTES
Patient had small bowel movement. Ambulated w/o assistance back to bed, gait observed and steady, denies h/a.

## 2018-11-18 NOTE — PROGRESS NOTES
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 I have personally checked for all pending labs that are awaiting final results. Subjective     Pt s/e @ bedside  No major events overnight  Little swelling on L eye this AM, from lying on that side  Had BM  Denies headache or vision changes    Objective     Visit Vitals  BP (!) 148/96 (BP 1 Location: Right arm, BP Patient Position: Sitting)   Pulse 62   Temp 98.9 °F (37.2 °C)   Resp 16   Ht 6' (1.829 m)   Wt 119.3 kg (263 lb)   SpO2 94%   BMI 35.67 kg/m²       Physical Exam:  General Appearance: NAD, conversant, head cap on  HEENT: mild ecchymoses R orbit (cont to improve), mild edema L orbit  Lungs: CTA with normal respiratory effort  CV: RRR, no m/r/g  Abdomen: soft, non-tender, normal bowel sounds  Extremities: no cyanosis, no peripheral edema  Neuro: No focal deficits, motor/sensory intact    Lab/Data Reviewed:  BMP: No results found for: NA, K, CL, CO2, AGAP, GLU, BUN, CREA, GFRAA, GFRNA  CBC: No results found for: WBC, HGB, HGBEXT, HCT, HCTEXT, PLT, PLTEXT, HGBEXT, HCTEXT, PLTEXT    Imaging Reviewed:  No results found.     Medications Reviewed:  Current Facility-Administered Medications   Medication Dose Route Frequency    dexamethasone (DECADRON) tablet 4 mg  4 mg Oral Q12H    cyanocobalamin tablet 1,000 mcg  1,000 mcg Oral DAILY    levETIRAcetam (KEPPRA) tablet 750 mg  750 mg Oral BID    labetalol (NORMODYNE;TRANDATE) 5 mg/mL injection 5 mg  5 mg IntraVENous Q2H PRN    raNITIdine (ZANTAC) tablet 150 mg  150 mg Oral BID    ondansetron (ZOFRAN) injection 4 mg  4 mg IntraVENous Q4H PRN    acetaminophen (TYLENOL) tablet 650 mg  650 mg Oral Q6H PRN    oxyCODONE-acetaminophen (PERCOCET) 5-325 mg per tablet 1 Tab  1 Tab Oral Q6H PRN    HYDROmorphone (PF) (DILAUDID) injection 1 mg  1 mg IntraVENous Q1H PRN    levothyroxine (SYNTHROID) tablet 200 mcg  200 mcg Oral ACB    influenza vaccine 2018-19 (6 mos+)(PF) (FLUARIX QUAD/FLULAVAL QUAD) injection 0.5 mL  0.5 mL IntraMUSCular PRIOR TO DISCHARGE           Claudetta Chinchilla,   Internal Medicine, Hospitalist  Pager: [phone]  [address] [place] [address]

## 2018-11-18 NOTE — PROGRESS NOTES
Bedside shift change report given to Fredi Granados RN (oncoming nurse) by Minnie Hernandez RN (offgoing nurse). Report included the following information SBAR, Kardex and MAR.     1910: Per off going RN IV site swollen, flushes well and patient states that she is likely going home in the morning and has nothing IV at this time. Will continue to monitor. Patient ambulated in room to the bathroom with minimal assistance. Bedside shift change report given to Disha Oliver RN (oncoming nurse) by Fredi Granados RN (offgoing nurse). Report included the following information SBAR, Kardex and MAR.

## 2019-02-18 ENCOUNTER — HOSPITAL ENCOUNTER (OUTPATIENT)
Dept: MRI IMAGING | Age: 49
Discharge: HOME | End: 2019-02-18
Attending: NEUROLOGICAL SURGERY
Payer: COMMERCIAL

## 2019-02-18 VITALS — BODY MASS INDEX: 35.26 KG/M2 | WEIGHT: 260 LBS

## 2019-02-18 DIAGNOSIS — Z09 POSTOP CHECK: ICD-10-CM

## 2019-02-18 DIAGNOSIS — D49.6 BRAIN TUMOR (HCC): ICD-10-CM

## 2019-02-18 DIAGNOSIS — C71.1 FRONTAL GLIOBLASTOMA (HCC): ICD-10-CM

## 2019-02-18 PROCEDURE — 70553 MRI BRAIN STEM W/O & W/DYE: CPT

## 2019-02-18 PROCEDURE — A9575 INJ GADOTERATE MEGLUMI 0.1ML: HCPCS | Performed by: NEUROLOGICAL SURGERY

## 2019-02-18 PROCEDURE — 74011250636 HC RX REV CODE- 250/636: Performed by: NEUROLOGICAL SURGERY

## 2019-02-18 RX ORDER — GADOTERATE MEGLUMINE 376.9 MG/ML
20 INJECTION INTRAVENOUS
Status: COMPLETED | OUTPATIENT
Start: 2019-02-18 | End: 2019-02-18

## 2019-02-18 RX ADMIN — GADOTERATE MEGLUMINE 20 ML: 376.9 INJECTION INTRAVENOUS at 10:26

## 2019-05-20 ENCOUNTER — HOSPITAL ENCOUNTER (OUTPATIENT)
Dept: MRI IMAGING | Age: 49
Discharge: HOME | End: 2019-05-20
Attending: NEUROLOGICAL SURGERY
Payer: COMMERCIAL

## 2019-05-20 VITALS — WEIGHT: 247 LBS | BODY MASS INDEX: 33.5 KG/M2

## 2019-05-20 DIAGNOSIS — D49.6 BRAIN TUMOR (HCC): ICD-10-CM

## 2019-05-20 LAB — CREAT UR-MCNC: 0.9 MG/DL (ref 0.6–1.3)

## 2019-05-20 PROCEDURE — A9575 INJ GADOTERATE MEGLUMI 0.1ML: HCPCS | Performed by: NEUROLOGICAL SURGERY

## 2019-05-20 PROCEDURE — 74011250636 HC RX REV CODE- 250/636: Performed by: NEUROLOGICAL SURGERY

## 2019-05-20 PROCEDURE — 82565 ASSAY OF CREATININE: CPT

## 2019-05-20 PROCEDURE — 70553 MRI BRAIN STEM W/O & W/DYE: CPT

## 2019-05-20 RX ORDER — GADOTERATE MEGLUMINE 376.9 MG/ML
20 INJECTION INTRAVENOUS
Status: COMPLETED | OUTPATIENT
Start: 2019-05-20 | End: 2019-05-20

## 2019-05-20 RX ADMIN — GADOTERATE MEGLUMINE 20 ML: 376.9 INJECTION INTRAVENOUS at 10:33

## 2019-10-08 ENCOUNTER — HOSPITAL ENCOUNTER (OUTPATIENT)
Dept: MRI IMAGING | Age: 49
Discharge: HOME | End: 2019-10-08
Attending: NEUROLOGICAL SURGERY
Payer: COMMERCIAL

## 2019-10-08 VITALS — BODY MASS INDEX: 31.87 KG/M2 | WEIGHT: 235 LBS

## 2019-10-08 DIAGNOSIS — D49.6 BRAIN TUMOR (HCC): ICD-10-CM

## 2019-10-08 DIAGNOSIS — C71.9 OLIGODENDROBLASTOMA (HCC): ICD-10-CM

## 2019-10-08 LAB — CREAT UR-MCNC: 0.9 MG/DL (ref 0.6–1.3)

## 2019-10-08 PROCEDURE — 70553 MRI BRAIN STEM W/O & W/DYE: CPT

## 2019-10-08 PROCEDURE — 74011250636 HC RX REV CODE- 250/636: Performed by: NEUROLOGICAL SURGERY

## 2019-10-08 PROCEDURE — A9575 INJ GADOTERATE MEGLUMI 0.1ML: HCPCS | Performed by: NEUROLOGICAL SURGERY

## 2019-10-08 PROCEDURE — 82565 ASSAY OF CREATININE: CPT

## 2019-10-08 RX ORDER — GADOTERATE MEGLUMINE 376.9 MG/ML
20 INJECTION INTRAVENOUS
Status: COMPLETED | OUTPATIENT
Start: 2019-10-08 | End: 2019-10-08

## 2019-10-08 RX ADMIN — GADOTERATE MEGLUMINE 20 ML: 376.9 INJECTION INTRAVENOUS at 09:40

## 2020-02-10 ENCOUNTER — HOSPITAL ENCOUNTER (OUTPATIENT)
Dept: MRI IMAGING | Age: 50
Discharge: HOME | End: 2020-02-10
Attending: NEUROLOGICAL SURGERY
Payer: COMMERCIAL

## 2020-02-10 VITALS — WEIGHT: 245 LBS | BODY MASS INDEX: 33.23 KG/M2

## 2020-02-10 DIAGNOSIS — C71.1 MALIGNANT NEOPLASM OF FRONTAL LOBE (HCC): ICD-10-CM

## 2020-02-10 LAB — CREAT UR-MCNC: 0.8 MG/DL (ref 0.6–1.3)

## 2020-02-10 PROCEDURE — 74011250636 HC RX REV CODE- 250/636: Performed by: NEUROLOGICAL SURGERY

## 2020-02-10 PROCEDURE — 70553 MRI BRAIN STEM W/O & W/DYE: CPT

## 2020-02-10 PROCEDURE — 82565 ASSAY OF CREATININE: CPT

## 2020-02-10 PROCEDURE — A9575 INJ GADOTERATE MEGLUMI 0.1ML: HCPCS | Performed by: NEUROLOGICAL SURGERY

## 2020-02-10 RX ORDER — GADOTERATE MEGLUMINE 376.9 MG/ML
20 INJECTION INTRAVENOUS
Status: COMPLETED | OUTPATIENT
Start: 2020-02-10 | End: 2020-02-10

## 2020-02-10 RX ADMIN — GADOTERATE MEGLUMINE 20 ML: 376.9 INJECTION INTRAVENOUS at 09:36

## 2020-06-22 ENCOUNTER — HOSPITAL ENCOUNTER (OUTPATIENT)
Dept: MRI IMAGING | Age: 50
Discharge: HOME | End: 2020-06-22
Attending: RADIOLOGY
Payer: COMMERCIAL

## 2020-06-22 VITALS — WEIGHT: 245 LBS | BODY MASS INDEX: 33.23 KG/M2

## 2020-06-22 DIAGNOSIS — C71.1 MALIGNANT NEOPLASM OF FRONTAL LOBE OF BRAIN (HCC): ICD-10-CM

## 2020-06-22 LAB — CREAT UR-MCNC: 1 MG/DL (ref 0.6–1.3)

## 2020-06-22 PROCEDURE — 74011250636 HC RX REV CODE- 250/636

## 2020-06-22 PROCEDURE — A9575 INJ GADOTERATE MEGLUMI 0.1ML: HCPCS

## 2020-06-22 PROCEDURE — 70553 MRI BRAIN STEM W/O & W/DYE: CPT

## 2020-06-22 PROCEDURE — 82565 ASSAY OF CREATININE: CPT

## 2020-06-22 RX ORDER — GADOTERATE MEGLUMINE 376.9 MG/ML
20 INJECTION INTRAVENOUS
Status: COMPLETED | OUTPATIENT
Start: 2020-06-22 | End: 2020-06-22

## 2020-06-22 RX ADMIN — GADOTERATE MEGLUMINE 20 ML: 376.9 INJECTION INTRAVENOUS at 07:32

## 2020-11-05 ENCOUNTER — HOSPITAL ENCOUNTER (OUTPATIENT)
Dept: MRI IMAGING | Age: 50
Discharge: HOME | End: 2020-11-05
Attending: RADIOLOGY
Payer: COMMERCIAL

## 2020-11-05 VITALS — WEIGHT: 260 LBS | BODY MASS INDEX: 35.26 KG/M2

## 2020-11-05 DIAGNOSIS — C71.1 MALIGNANT NEOPLASM OF FRONTAL LOBE OF BRAIN (HCC): ICD-10-CM

## 2020-11-05 LAB — CREAT UR-MCNC: 0.9 MG/DL (ref 0.6–1.3)

## 2020-11-05 PROCEDURE — 82565 ASSAY OF CREATININE: CPT

## 2020-11-05 PROCEDURE — 74011250636 HC RX REV CODE- 250/636

## 2020-11-05 PROCEDURE — 70553 MRI BRAIN STEM W/O & W/DYE: CPT

## 2020-11-05 PROCEDURE — A9575 INJ GADOTERATE MEGLUMI 0.1ML: HCPCS

## 2020-11-05 RX ORDER — GADOTERATE MEGLUMINE 376.9 MG/ML
20 INJECTION INTRAVENOUS
Status: COMPLETED | OUTPATIENT
Start: 2020-11-05 | End: 2020-11-05

## 2020-11-05 RX ADMIN — GADOTERATE MEGLUMINE 20 ML: 376.9 INJECTION INTRAVENOUS at 11:27

## 2021-05-11 ENCOUNTER — TRANSCRIBE ORDER (OUTPATIENT)
Dept: SCHEDULING | Age: 51
End: 2021-05-11

## 2021-05-11 DIAGNOSIS — C71.1 MALIGNANT NEOPLASM OF FRONTAL LOBE OF BRAIN (HCC): Primary | ICD-10-CM

## 2021-06-01 RX ORDER — TAMOXIFEN CITRATE 20 MG/1
20 TABLET ORAL DAILY
COMMUNITY

## 2021-06-01 RX ORDER — ATORVASTATIN CALCIUM 20 MG/1
20 TABLET, FILM COATED ORAL DAILY
COMMUNITY

## 2021-06-01 RX ORDER — AMLODIPINE BESYLATE 10 MG/1
TABLET ORAL DAILY
COMMUNITY

## 2021-06-01 RX ORDER — METFORMIN HYDROCHLORIDE 750 MG/1
750 TABLET, EXTENDED RELEASE ORAL DAILY
COMMUNITY

## 2021-06-01 NOTE — PERIOP NOTES
PRE-SURGICAL INSTRUCTIONS        Patient's Name:  Vikash Farris Date:  6/8             Surgery and Surgery Date:  Colonoscopy 6/8              1. Do NOT eat or drink anything, including candy, gum, or ice chips after midnight on 6/8, unless you have specific instructions from your surgeon or anesthesia provider to do so. 2. Brush your teeth before coming to the hospital.  3. No smoking 24 hours prior to the day of surgery. 4. No alcohol 24 hours prior to the day of surgery. 5. No recreational drugs for one week prior to the day of surgery. 6. Leave all valuables, including money/purse, at home. 7. Remove all jewelry, nail polish, acrylic nails, and makeup (including mascara); no lotions powders, deodorant, or perfume/cologne/after shave on the skin. 8. Glasses/contact lenses and dentures may be worn to the hospital.  They will be removed prior to surgery. 9. Call your doctor if symptoms of a cold or illness develop within 24-48 hours prior to your surgery. 10.  AN ADULT MUST DRIVE YOU HOME AFTER OUTPATIENT SURGERY. 11.  If you are having an outpatient procedure, please make arrangements for a responsible adult to be with you for 24 hours after your surgery. 12.  NO VISITORS in the hospital at this time for outpatient procedures. Exceptions may be made for surgical admissions, per nursing unit guidelines      Special Instructions:      Bring list of CURRENT medications. Bring any pertinent legal medical records. Take these medications the morning of surgery with a sip of water:  Keppra, amlodipine    Complete bowel prep per MD instructions. On the day of surgery, come in the main entrance of [facility]. Let the  at the desk know you are there for surgery. A staff member will come escort you to the surgical area on the second floor.     If you have any questions or concerns, please do not hesitate to call:     (Prior to the day of surgery) PAT department: [phone]   (Day of surgery) Pre-Op department:  [phone]    These surgical instructions were reviewed with the patient during the PAT phone call.

## 2021-06-02 NOTE — H&P
History and Physical reviewed; I have examined the patient and there are no pertinent changes. Marie Herring MD, MD  
7:35 AM 6/8/2021 [facility], [address] 
[email or web address]

## 2021-06-03 ENCOUNTER — HOSPITAL ENCOUNTER (OUTPATIENT)
Dept: LAB | Age: 51
Discharge: HOME | End: 2021-06-03
Payer: COMMERCIAL

## 2021-06-03 PROCEDURE — U0003 INFECTIOUS AGENT DETECTION BY NUCLEIC ACID (DNA OR RNA); SEVERE ACUTE RESPIRATORY SYNDROME CORONAVIRUS 2 (SARS-COV-2) (CORONAVIRUS DISEASE [COVID-19]), AMPLIFIED PROBE TECHNIQUE, MAKING USE OF HIGH THROUGHPUT TECHNOLOGIES AS DESCRIBED BY CMS-2020-01-R: HCPCS

## 2021-06-04 LAB — SARS-COV-2, COV2NT: NOT DETECTED

## 2021-06-07 ENCOUNTER — ANESTHESIA EVENT (OUTPATIENT)
Dept: ENDOSCOPY | Age: 51
End: 2021-06-07
Payer: COMMERCIAL

## 2021-06-08 ENCOUNTER — ANESTHESIA (OUTPATIENT)
Dept: ENDOSCOPY | Age: 51
End: 2021-06-08
Payer: COMMERCIAL

## 2021-06-08 ENCOUNTER — HOSPITAL ENCOUNTER (OUTPATIENT)
Age: 51
Setting detail: OUTPATIENT SURGERY
Discharge: HOME | End: 2021-06-08
Attending: INTERNAL MEDICINE | Admitting: INTERNAL MEDICINE
Payer: COMMERCIAL

## 2021-06-08 VITALS
BODY MASS INDEX: 34.4 KG/M2 | DIASTOLIC BLOOD PRESSURE: 80 MMHG | OXYGEN SATURATION: 97 % | RESPIRATION RATE: 20 BRPM | TEMPERATURE: 98.3 F | HEIGHT: 72 IN | SYSTOLIC BLOOD PRESSURE: 123 MMHG | WEIGHT: 254 LBS | HEART RATE: 57 BPM

## 2021-06-08 LAB
GLUCOSE BLD STRIP.AUTO-MCNC: 133 MG/DL (ref 70–110)
GLUCOSE BLD STRIP.AUTO-MCNC: 154 MG/DL (ref 70–110)
HCG SERPL QL: NEGATIVE

## 2021-06-08 PROCEDURE — 74011000250 HC RX REV CODE- 250: Performed by: NURSE ANESTHETIST, CERTIFIED REGISTERED

## 2021-06-08 PROCEDURE — 74011250636 HC RX REV CODE- 250/636: Performed by: NURSE ANESTHETIST, CERTIFIED REGISTERED

## 2021-06-08 PROCEDURE — 77030021593 HC FCPS BIOP ENDOSC BSC -A: Performed by: INTERNAL MEDICINE

## 2021-06-08 PROCEDURE — 77030040934 HC CATH DIAG DXTERITY MEDT -A: Performed by: INTERNAL MEDICINE

## 2021-06-08 PROCEDURE — 2709999900 HC NON-CHARGEABLE SUPPLY: Performed by: INTERNAL MEDICINE

## 2021-06-08 PROCEDURE — 74011636637 HC RX REV CODE- 636/637: Performed by: NURSE ANESTHETIST, CERTIFIED REGISTERED

## 2021-06-08 PROCEDURE — 00812 ANES LWR INTST SCR COLSC: CPT | Performed by: NURSE ANESTHETIST, CERTIFIED REGISTERED

## 2021-06-08 PROCEDURE — 77030013992 HC SNR POLYP ENDOSC BSC -B: Performed by: INTERNAL MEDICINE

## 2021-06-08 PROCEDURE — 84703 CHORIONIC GONADOTROPIN ASSAY: CPT

## 2021-06-08 PROCEDURE — 76040000019: Performed by: INTERNAL MEDICINE

## 2021-06-08 PROCEDURE — 82962 GLUCOSE BLOOD TEST: CPT

## 2021-06-08 PROCEDURE — 74011250637 HC RX REV CODE- 250/637: Performed by: INTERNAL MEDICINE

## 2021-06-08 PROCEDURE — 77030008565 HC TBNG SUC IRR ERBE -B: Performed by: INTERNAL MEDICINE

## 2021-06-08 PROCEDURE — 00812 ANES LWR INTST SCR COLSC: CPT | Performed by: ANESTHESIOLOGY

## 2021-06-08 PROCEDURE — 76060000031 HC ANESTHESIA FIRST 0.5 HR: Performed by: INTERNAL MEDICINE

## 2021-06-08 RX ORDER — SODIUM CHLORIDE 0.9 % (FLUSH) 0.9 %
5-40 SYRINGE (ML) INJECTION AS NEEDED
Status: CANCELLED | OUTPATIENT
Start: 2021-06-08

## 2021-06-08 RX ORDER — NALOXONE HYDROCHLORIDE 0.4 MG/ML
0.4 INJECTION, SOLUTION INTRAMUSCULAR; INTRAVENOUS; SUBCUTANEOUS
Status: CANCELLED | OUTPATIENT
Start: 2021-06-08 | End: 2021-06-08

## 2021-06-08 RX ORDER — INSULIN LISPRO 100 [IU]/ML
INJECTION, SOLUTION INTRAVENOUS; SUBCUTANEOUS ONCE
Status: COMPLETED | OUTPATIENT
Start: 2021-06-08 | End: 2021-06-08

## 2021-06-08 RX ORDER — EPINEPHRINE 0.1 MG/ML
1 INJECTION INTRACARDIAC; INTRAVENOUS
Status: CANCELLED | OUTPATIENT
Start: 2021-06-08 | End: 2021-06-09

## 2021-06-08 RX ORDER — SODIUM CHLORIDE, SODIUM LACTATE, POTASSIUM CHLORIDE, CALCIUM CHLORIDE 600; 310; 30; 20 MG/100ML; MG/100ML; MG/100ML; MG/100ML
75 INJECTION, SOLUTION INTRAVENOUS CONTINUOUS
Status: DISCONTINUED | OUTPATIENT
Start: 2021-06-08 | End: 2021-06-08 | Stop reason: HOSPADM

## 2021-06-08 RX ORDER — MAGNESIUM SULFATE 100 %
4 CRYSTALS MISCELLANEOUS AS NEEDED
Status: CANCELLED | OUTPATIENT
Start: 2021-06-08

## 2021-06-08 RX ORDER — SODIUM CHLORIDE 0.9 % (FLUSH) 0.9 %
5-40 SYRINGE (ML) INJECTION AS NEEDED
Status: DISCONTINUED | OUTPATIENT
Start: 2021-06-08 | End: 2021-06-08 | Stop reason: HOSPADM

## 2021-06-08 RX ORDER — SODIUM CHLORIDE, SODIUM LACTATE, POTASSIUM CHLORIDE, CALCIUM CHLORIDE 600; 310; 30; 20 MG/100ML; MG/100ML; MG/100ML; MG/100ML
50 INJECTION, SOLUTION INTRAVENOUS CONTINUOUS
Status: CANCELLED | OUTPATIENT
Start: 2021-06-08

## 2021-06-08 RX ORDER — SODIUM CHLORIDE 0.9 % (FLUSH) 0.9 %
5-40 SYRINGE (ML) INJECTION EVERY 8 HOURS
Status: CANCELLED | OUTPATIENT
Start: 2021-06-08

## 2021-06-08 RX ORDER — INSULIN LISPRO 100 [IU]/ML
INJECTION, SOLUTION INTRAVENOUS; SUBCUTANEOUS ONCE
Status: CANCELLED | OUTPATIENT
Start: 2021-06-08 | End: 2021-06-08

## 2021-06-08 RX ORDER — FLUMAZENIL 0.1 MG/ML
0.2 INJECTION INTRAVENOUS
Status: CANCELLED | OUTPATIENT
Start: 2021-06-08 | End: 2021-06-08

## 2021-06-08 RX ORDER — DEXTROMETHORPHAN/PSEUDOEPHED 2.5-7.5/.8
DROPS ORAL AS NEEDED
Status: DISCONTINUED | OUTPATIENT
Start: 2021-06-08 | End: 2021-06-08 | Stop reason: HOSPADM

## 2021-06-08 RX ORDER — ATROPINE SULFATE 0.1 MG/ML
0.5 INJECTION INTRAVENOUS
Status: CANCELLED | OUTPATIENT
Start: 2021-06-08 | End: 2021-06-09

## 2021-06-08 RX ORDER — PROPOFOL 10 MG/ML
INJECTION, EMULSION INTRAVENOUS AS NEEDED
Status: DISCONTINUED | OUTPATIENT
Start: 2021-06-08 | End: 2021-06-08 | Stop reason: HOSPADM

## 2021-06-08 RX ORDER — SODIUM CHLORIDE 0.9 % (FLUSH) 0.9 %
5-40 SYRINGE (ML) INJECTION EVERY 8 HOURS
Status: DISCONTINUED | OUTPATIENT
Start: 2021-06-08 | End: 2021-06-08 | Stop reason: HOSPADM

## 2021-06-08 RX ORDER — LIDOCAINE HYDROCHLORIDE 10 MG/ML
0.1 INJECTION, SOLUTION EPIDURAL; INFILTRATION; INTRACAUDAL; PERINEURAL AS NEEDED
Status: DISCONTINUED | OUTPATIENT
Start: 2021-06-08 | End: 2021-06-08 | Stop reason: HOSPADM

## 2021-06-08 RX ORDER — LIDOCAINE HYDROCHLORIDE 20 MG/ML
INJECTION, SOLUTION EPIDURAL; INFILTRATION; INTRACAUDAL; PERINEURAL AS NEEDED
Status: DISCONTINUED | OUTPATIENT
Start: 2021-06-08 | End: 2021-06-08 | Stop reason: HOSPADM

## 2021-06-08 RX ORDER — DEXTROMETHORPHAN/PSEUDOEPHED 2.5-7.5/.8
1.2 DROPS ORAL
Status: CANCELLED | OUTPATIENT
Start: 2021-06-08

## 2021-06-08 RX ORDER — DEXTROSE 50 % IN WATER (D50W) INTRAVENOUS SYRINGE
25-50 AS NEEDED
Status: CANCELLED | OUTPATIENT
Start: 2021-06-08

## 2021-06-08 RX ADMIN — FAMOTIDINE 20 MG: 10 INJECTION INTRAVENOUS at 06:46

## 2021-06-08 RX ADMIN — PROPOFOL 50 MG: 10 INJECTION, EMULSION INTRAVENOUS at 07:51

## 2021-06-08 RX ADMIN — SODIUM CHLORIDE, SODIUM LACTATE, POTASSIUM CHLORIDE, AND CALCIUM CHLORIDE 75 ML/HR: 600; 310; 30; 20 INJECTION, SOLUTION INTRAVENOUS at 06:46

## 2021-06-08 RX ADMIN — LIDOCAINE HYDROCHLORIDE 50 MG: 20 INJECTION, SOLUTION EPIDURAL; INFILTRATION; INTRACAUDAL; PERINEURAL at 07:43

## 2021-06-08 RX ADMIN — PROPOFOL 50 MG: 10 INJECTION, EMULSION INTRAVENOUS at 07:47

## 2021-06-08 RX ADMIN — PROPOFOL 80 MG: 10 INJECTION, EMULSION INTRAVENOUS at 07:44

## 2021-06-08 RX ADMIN — INSULIN LISPRO 3 UNITS: 100 INJECTION, SOLUTION INTRAVENOUS; SUBCUTANEOUS at 06:48

## 2021-06-08 NOTE — PROCEDURES
[email or web address]  [place], [address]      Brief Procedure Note    Pauline Gastelum  1970  [id number]    Date of Procedure: 6/8/2021    Preoperative diagnosis: Screening for malignant neoplasms of colon:  V76.51 - Z12.11  Obesity, BMI over 30.  Instructed to work on weight loss:  278.02 - E66.3  Diabetes mellitus type 2:  250.00 - E11.9  Essential (primary) hypertension:  401.0 - I10  Neoplasm of breast:  239.3 - D49.3  Neoplasm of cerebrum:  239.6 - D49.6    Postoperative diagnosis: small hemorrhoids otherwise normal    Type of Anesthesia: MAC (Monitored anesthesia care)    Description of findings: same as post op dx    Procedure: Procedure(s):  COLONOSCOPY    :  Dr. Binh Dallas MD    Assistant(s): Endoscopy RN-1: Tom Can RN; Brittney Hanks RN    EBL:None    Specimens:  None    Findings: See printed and scanned procedure note    Complications: None    Dr. Binh Dallas MD  6/8/2021  8:06 AM

## 2021-06-08 NOTE — ANESTHESIA POSTPROCEDURE EVALUATION
Procedure(s):  COLONOSCOPY. MAC    Anesthesia Post Evaluation      Multimodal analgesia: multimodal analgesia used between 6 hours prior to anesthesia start to PACU discharge  Patient location during evaluation: bedside  Patient participation: complete - patient participated  Level of consciousness: awake  Pain management: adequate  Airway patency: patent  Anesthetic complications: no  Cardiovascular status: stable  Respiratory status: acceptable  Hydration status: acceptable  Post anesthesia nausea and vomiting:  controlled      INITIAL Post-op Vital signs:   Vitals Value Taken Time   /76 06/08/21 0813   Temp     Pulse 64 06/08/21 0815   Resp 20 06/08/21 0815   SpO2 96 % 06/08/21 0815   Vitals shown include unvalidated device data.

## 2021-06-08 NOTE — DISCHARGE INSTRUCTIONS
Patient Education        Colonoscopy: What to Expect at [address] [place] [address]  After a colonoscopy, you'll stay at the clinic for 1 to 2 hours until the medicines wear off. Then you can go home. But you'll need to arrange for a ride. Your doctor will tell you when you can eat and do your other usual activities. Your doctor will talk to you about when you'll need your next colonoscopy. Your doctor can help you decide how often you need to be checked. This will depend on the results of your test and your risk for colorectal cancer. After the test, you may be bloated or have gas pains. You may need to pass gas. If a biopsy was done or a polyp was removed, you may have streaks of blood in your stool (feces) for a few days. Problems such as heavy rectal bleeding may not occur until several weeks after the test. This isn't common. But it can happen after polyps are removed. This care sheet gives you a general idea about how long it will take for you to recover. But each person recovers at a different pace. Follow the steps below to get better as quickly as possible. How can you care for yourself at home? Activity    · Rest when you feel tired.     · You can do your normal activities when it feels okay to do so. Diet    · Follow your doctor's directions for eating.     · Unless your doctor has told you not to, drink plenty of fluids. This helps to replace the fluids that were lost during the colon prep.     · Do not drink alcohol. Medicines    · Your doctor will tell you if and when you can restart your medicines. He or she will also give you instructions about taking any new medicines.     · If you take aspirin or some other blood thinner, ask your doctor if and when to start taking it again.  Make sure that you understand exactly what your doctor wants you to do.     · If polyps were removed or a biopsy was done during the test, your doctor may tell you not to take aspirin or other anti-inflammatory medicines for a few days. These include ibuprofen (Advil, Motrin) and naproxen (Aleve). Other instructions    · For your safety, do not drive or operate machinery until the medicine wears off and you can think clearly. Your doctor may tell you not to drive or operate machinery until the day after your test.     · Do not sign legal documents or make major decisions until the medicine wears off and you can think clearly. The anesthesia can make it hard for you to fully understand what you are agreeing to. Follow-up care is a key part of your treatment and safety. Be sure to make and go to all appointments, and call your doctor if you are having problems. It's also a good idea to know your test results and keep a list of the medicines you take. When should you call for help? Call 911 anytime you think you may need emergency care. For example, call if:    · You passed out (lost consciousness).     · You pass maroon or bloody stools.     · You have trouble breathing. Call your doctor now or seek immediate medical care if:    · You have pain that does not get better after you take pain medicine.     · You are sick to your stomach or cannot drink fluids.     · You have new or worse belly pain.     · You have blood in your stools.     · You have a fever.     · You cannot pass stools or gas. Watch closely for changes in your health, and be sure to contact your doctor if you have any problems. Where can you learn more? Go to http://www.gray.com/  Enter E264 in the search box to learn more about \"Colonoscopy: What to Expect at Home. \"  Current as of: December 17, 2020               Content Version: 12.8  © 9929-1726 [employer]. Care instructions adapted under license by [employer] (which disclaims liability or warranty for this information).  If you have questions about a medical condition or this instruction, always ask your healthcare professional. Fidel Hernandez disclaims any warranty or liability for your use of this information. Patient Education        Hemorrhoids: Care Instructions  Your Care Instructions     Hemorrhoids are enlarged veins that develop in the anal canal. Bleeding during bowel movements, itching, swelling, and rectal pain are the most common symptoms. They can be uncomfortable at times, but hemorrhoids rarely are a serious problem. You can treat most hemorrhoids with simple changes to your diet and bowel habits. These changes include eating more fiber and not straining to pass stools. Most hemorrhoids do not need surgery or other treatment unless they are very large and painful or bleed a lot. Follow-up care is a key part of your treatment and safety. Be sure to make and go to all appointments, and call your doctor if you are having problems. It's also a good idea to know your test results and keep a list of the medicines you take. How can you care for yourself at home? · Sit in a few inches of warm water (sitz bath) 3 times a day and after bowel movements. The warm water helps with pain and itching. · Put ice on your anal area several times a day for 10 minutes at a time. Put a thin cloth between the ice and your skin. Follow this by placing a warm, wet towel on the area for another 10 to 20 minutes. · Take pain medicines exactly as directed. ? If the doctor gave you a prescription medicine for pain, take it as prescribed. ? If you are not taking a prescription pain medicine, ask your doctor if you can take an over-the-counter medicine. · Keep the anal area clean, but be gentle. Use water and a fragrance-free soap, such as [place], or use baby wipes or medicated pads, such as Tucks. · Wear cotton underwear and loose clothing to decrease moisture in the anal area. · Eat more fiber. Include foods such as whole-grain breads and cereals, raw vegetables, raw and dried fruits, and beans. · Drink plenty of fluids.  If you have kidney, heart, or liver disease and have to limit fluids, talk with your doctor before you increase the amount of fluids you drink. · Use a stool softener that contains bran or psyllium. You can save money by buying bran or psyllium (available in bulk at most health food stores) and sprinkling it on foods or stirring it into fruit juice. Or you can use a product such as Metamucil or Hydrocil. · Practice healthy bowel habits. ? Go to the bathroom as soon as you have the urge. ? Avoid straining to pass stools. Relax and give yourself time to let things happen naturally. ? Do not hold your breath while passing stools. ? Do not read while sitting on the toilet. Get off the toilet as soon as you have finished. · Take your medicines exactly as prescribed. Call your doctor if you think you are having a problem with your medicine. When should you call for help? Call 911 anytime you think you may need emergency care. For example, call if:    · You pass maroon or very bloody stools. Call your doctor now or seek immediate medical care if:    · You have increased pain.     · You have increased bleeding. Watch closely for changes in your health, and be sure to contact your doctor if:    · Your symptoms have not improved after 3 or 4 days. Where can you learn more? Go to http://www.jackson.com/  Enter F228 in the search box to learn more about \"Hemorrhoids: Care Instructions. \"  Current as of: April 15, 2020               Content Version: 12.8  © 2006-2021 [employer]. Care instructions adapted under license by [employer] (which disclaims liability or warranty for this information). If you have questions about a medical condition or this instruction, always ask your healthcare professional. [place] [address] any warranty or liability for your use of this information.      Patient Education        High-Fiber Diet: Care Instructions  Your Care Instructions     A high-fiber diet may help you relieve constipation and feel less bloated. Your doctor and dietitian will help you make a high-fiber eating plan based on your personal needs. The plan will include the things you like to eat. It will also make sure that you get 30 grams of fiber a day. Before you make changes to the way you eat, be sure to talk with your doctor or dietitian. Follow-up care is a key part of your treatment and safety. Be sure to make and go to all appointments, and call your doctor if you are having problems. It's also a good idea to know your test results and keep a list of the medicines you take. How can you care for yourself at home? · You can increase how much fiber you get if you eat more of certain foods. These foods include:  ? Whole-grain breads and cereals. ? Fruits, such as pears, apples, and peaches. Eat the skins, peels, and seeds, if you can.  ? Vegetables, such as broccoli, cabbage, spinach, carrots, asparagus, and squash. ? Starchy vegetables. These include potatoes with skins, kidney beans, and lima beans. · Take a fiber supplement every day if your doctor recommends it. Examples are Benefiber, Citrucel, FiberCon, and Metamucil. Ask your doctor how much to take. · Drink plenty of fluids. If you have kidney, heart, or liver disease and have to limit fluids, talk with your doctor before you increase the amount of fluids you drink. · Get some exercise every day. Exercise helps stool move through the colon. It also helps prevent constipation. · Keep a food diary. Try to notice and write down what foods cause gas, pain, or other symptoms. Then you can avoid these foods. Where can you learn more? Go to http://www.gray.com/  Enter X088 in the search box to learn more about \"High-Fiber Diet: Care Instructions. \"  Current as of: December 17, 2020               Content Version: 12.8  © 5284-6168 [employer], [employer].    Care instructions adapted under license by [address] (which disclaims liability or warranty for this information). If you have questions about a medical condition or this instruction, always ask your healthcare professional. [place] [address] any warranty or liability for your use of this information.   Patient armband removed and shredded

## 2021-06-08 NOTE — ANESTHESIA PREPROCEDURE EVALUATION
Relevant Problems   No relevant active problems       Anesthetic History     PONV          Review of Systems / Medical History  Patient summary reviewed and pertinent labs reviewed    Pulmonary  Within defined limits                 Neuro/Psych     seizures: well controlled         Cardiovascular    Hypertension: well controlled              Exercise tolerance: >4 METS     GI/Hepatic/Renal                Endo/Other    Diabetes: well controlled, type 2  Hypothyroidism: well controlled       Other Findings              Physical Exam    Airway  Mallampati: III  TM Distance: 4 - 6 cm  Neck ROM: normal range of motion   Mouth opening: Normal     Cardiovascular    Rhythm: regular  Rate: normal         Dental  No notable dental hx       Pulmonary  Breath sounds clear to auscultation               Abdominal  GI exam deferred       Other Findings            Anesthetic Plan    ASA: 3  Anesthesia type: MAC            Anesthetic plan and risks discussed with: Patient

## 2021-08-10 ENCOUNTER — HOSPITAL ENCOUNTER (OUTPATIENT)
Age: 51
Discharge: HOME | End: 2021-08-10
Attending: RADIOLOGY
Payer: COMMERCIAL

## 2021-08-10 DIAGNOSIS — C71.1 MALIGNANT NEOPLASM OF FRONTAL LOBE OF BRAIN (HCC): ICD-10-CM

## 2021-08-10 PROCEDURE — 70553 MRI BRAIN STEM W/O & W/DYE: CPT

## 2021-08-10 PROCEDURE — A9575 INJ GADOTERATE MEGLUMI 0.1ML: HCPCS

## 2021-08-10 PROCEDURE — 74011636320 HC RX REV CODE- 636/320

## 2021-08-10 PROCEDURE — 82565 ASSAY OF CREATININE: CPT

## 2021-08-10 RX ADMIN — GADOTERATE MEGLUMINE 20 ML: 376.9 INJECTION INTRAVENOUS at 19:05

## 2021-08-13 LAB — CREAT UR-MCNC: 1 MG/DL (ref 0.6–1.3)

## 2022-03-19 PROBLEM — G93.89 RIGHT FRONTAL LOBE MASS: Status: ACTIVE | Noted: 2018-11-14

## 2022-03-19 PROBLEM — R56.9 SEIZURES (HCC): Status: ACTIVE | Noted: 2018-10-17

## 2023-02-14 ENCOUNTER — TRANSCRIBE ORDERS (OUTPATIENT)
Facility: HOSPITAL | Age: 53
End: 2023-02-14

## 2023-02-14 DIAGNOSIS — C71.9 OLIGODENDROGLIOMA (HCC): Primary | ICD-10-CM

## 2023-03-12 ENCOUNTER — HOSPITAL ENCOUNTER (OUTPATIENT)
Facility: HOSPITAL | Age: 53
Discharge: HOME | End: 2023-03-15
Payer: COMMERCIAL

## 2023-03-12 DIAGNOSIS — C71.9 OLIGODENDROGLIOMA (HCC): ICD-10-CM

## 2023-03-12 PROCEDURE — A9575 INJ GADOTERATE MEGLUMI 0.1ML: HCPCS | Performed by: RADIOLOGY

## 2023-03-12 PROCEDURE — 6360000004 HC RX CONTRAST MEDICATION: Performed by: RADIOLOGY

## 2023-03-12 PROCEDURE — 70553 MRI BRAIN STEM W/O & W/DYE: CPT

## 2023-03-12 PROCEDURE — 82565 ASSAY OF CREATININE: CPT

## 2023-03-12 RX ADMIN — GADOTERATE MEGLUMINE 20 ML: 376.9 INJECTION INTRAVENOUS at 10:34

## 2023-03-13 LAB — CREAT UR-MCNC: 1 MG/DL (ref 0.6–1.3)

## 2024-02-05 ENCOUNTER — TRANSCRIBE ORDERS (OUTPATIENT)
Facility: HOSPITAL | Age: 54
End: 2024-02-05

## 2024-02-05 DIAGNOSIS — C71.1 MALIGNANT NEOPLASM OF FRONTAL LOBE OF BRAIN (HCC): Primary | ICD-10-CM

## 2024-03-16 ENCOUNTER — HOSPITAL ENCOUNTER (OUTPATIENT)
Facility: HOSPITAL | Age: 54
End: 2024-03-16
Payer: COMMERCIAL

## 2024-03-16 DIAGNOSIS — C71.1 MALIGNANT NEOPLASM OF FRONTAL LOBE OF BRAIN (HCC): ICD-10-CM

## 2024-03-16 LAB — CREAT UR-MCNC: 1 MG/DL (ref 0.6–1.3)

## 2024-03-16 PROCEDURE — 70553 MRI BRAIN STEM W/O & W/DYE: CPT

## 2024-03-16 PROCEDURE — A9577 INJ MULTIHANCE: HCPCS | Performed by: RADIOLOGY

## 2024-03-16 PROCEDURE — 82565 ASSAY OF CREATININE: CPT

## 2024-03-16 PROCEDURE — 6360000004 HC RX CONTRAST MEDICATION: Performed by: RADIOLOGY

## 2024-03-16 RX ADMIN — GADOBENATE DIMEGLUMINE 20 ML: 529 INJECTION, SOLUTION INTRAVENOUS at 16:36

## 2025-04-02 ENCOUNTER — TRANSCRIBE ORDERS (OUTPATIENT)
Facility: HOSPITAL | Age: 55
End: 2025-04-02

## 2025-04-02 DIAGNOSIS — C71.1 MALIGNANT NEOPLASM OF FRONTAL LOBE (HCC): Primary | ICD-10-CM

## 2025-05-12 ENCOUNTER — HOSPITAL ENCOUNTER (OUTPATIENT)
Age: 55
Discharge: HOME | End: 2025-05-15
Payer: COMMERCIAL

## 2025-05-12 DIAGNOSIS — C71.1 MALIGNANT NEOPLASM OF FRONTAL LOBE (HCC): ICD-10-CM

## 2025-05-12 LAB — CREAT UR-MCNC: 1 MG/DL (ref 0.6–1.3)

## 2025-05-12 PROCEDURE — A9575 INJ GADOTERATE MEGLUMI 0.1ML: HCPCS | Performed by: RADIOLOGY

## 2025-05-12 PROCEDURE — 6360000004 HC RX CONTRAST MEDICATION: Performed by: RADIOLOGY

## 2025-05-12 PROCEDURE — 70553 MRI BRAIN STEM W/O & W/DYE: CPT

## 2025-05-12 PROCEDURE — 82565 ASSAY OF CREATININE: CPT

## 2025-05-12 RX ADMIN — GADOTERATE MEGLUMINE 20 ML: 376.9 INJECTION INTRAVENOUS at 16:21

## (undated) DEVICE — GOWN,AURORA,FABRIC-REINFORCED,X-LARGE: Brand: MEDLINE

## (undated) DEVICE — MEDI-VAC NON-CONDUCTIVE SUCTION TUBING: Brand: CARDINAL HEALTH

## (undated) DEVICE — MAYFIELD® DISPOSABLE ADULT SKULL PIN (PLASTIC BASE): Brand: MAYFIELD®

## (undated) DEVICE — STOCKINETTE TUBE BLN 2PLY 6X72 -- MEDICHOICE CONVERT TO 363488

## (undated) DEVICE — SYR 10ML CTRL LR LCK NSAF LF --

## (undated) DEVICE — GOWN ISOL IMPERV UNIV, DISP, OPEN BACK, BLUE --

## (undated) DEVICE — DRAPE SHEET, X-LARGE: Brand: CONVERTORS

## (undated) DEVICE — ENDOSCOPY PUMP TUBING/ CAP SET: Brand: ERBE

## (undated) DEVICE — Device

## (undated) DEVICE — STERILE LATEX POWDER-FREE SURGICAL GLOVESWITH NITRILE COATING: Brand: PROTEXIS

## (undated) DEVICE — HOOK RETRCT DIA5MM SHRP E STAY DISP FOR LONE STAR SELF RET

## (undated) DEVICE — Z INACTIVE PER BARD TRAY URO DIA16FR INF CTRL F COMPLT CARE 350ML URIN M

## (undated) DEVICE — BAND RUB 1/8X2.5IN STRL --

## (undated) DEVICE — LARGE BORE STOPCOCK WITH ROTATING MALE LUER LOCK

## (undated) DEVICE — CATHETER IV 10GA L3IN OD3.3-3.5MM ID2.64-2.74MM BRN FEP

## (undated) DEVICE — CAUTERY TIP POLISHER: Brand: DEVON

## (undated) DEVICE — COVER SURG EQUIP DRP MICSCP 118IN LEN 43IN W ZEISS OPMI

## (undated) DEVICE — SYRINGE MED 20ML STD CLR PLAS LUERLOCK TIP N CTRL DISP

## (undated) DEVICE — NDL PRT INJ NSAF BLNT 18GX1.5 --

## (undated) DEVICE — SUTURE VCRL SZ 2-0 L18IN ABSRB UD CT-1 L36MM 1/2 CIR J839D

## (undated) DEVICE — (D)GLOVE SURG ORTH 7 PWD LTX -- DISC BY MFR USE ITEM 278013

## (undated) DEVICE — CATHETER IV 14GA L1.25IN ORNG POLY SFTY SYS FULL ENCASED

## (undated) DEVICE — TOWEL: OR BLU 80/CS: Brand: MEDICAL ACTION INDUSTRIES

## (undated) DEVICE — PACKING 8004008 NEURAY 200PK 25X76MM: Brand: NEURAY ®

## (undated) DEVICE — DRESSING,GAUZE,XEROFORM,CURAD,1"X8",ST: Brand: CURAD

## (undated) DEVICE — NEEDLE HYPO 22GA L1.5IN BLK POLYPR HUB S STL REG BVL STR

## (undated) DEVICE — CODMAN® SURGICAL PATTIES 1/2" X 1/2" (1.27CM X 1.27CM): Brand: CODMAN®

## (undated) DEVICE — 10FR FRAZIER SUCTION HANDLE: Brand: CARDINAL HEALTH

## (undated) DEVICE — CATHETER SUCT TR FL TIP 14FR W/ O CTRL

## (undated) DEVICE — REM POLYHESIVE ADULT PATIENT RETURN ELECTRODE: Brand: VALLEYLAB

## (undated) DEVICE — AIRLIFE™ NASAL OXYGEN CANNULA CURVED, NONFLARED TIP WITH 14 FOOT (4.3 M) CRUSH-RESISTANT TUBING, OVER-THE-EAR STYLE: Brand: AIRLIFE™

## (undated) DEVICE — SYR 10ML LUER LOK 1/5ML GRAD --

## (undated) DEVICE — CORD BPLR 2 PIN FLAT AND RND DISP

## (undated) DEVICE — TELFA NON-ADHERENT ABSORBENT DRESSING: Brand: TELFA

## (undated) DEVICE — SKIN MARKER,REGULAR TIP WITH RULER AND LABELS: Brand: DEVON

## (undated) DEVICE — DURASEAL® EXACT SPINAL SEALANT SYSTEM 5ML 5 PACK: Brand: DURASEAL EXACT SPINAL SEALANT SYSTEM

## (undated) DEVICE — KENDALL SCD EXPRESS SLEEVES, KNEE LENGTH, MEDIUM: Brand: KENDALL SCD

## (undated) DEVICE — DRAPE THER FLUID WARMING 66X44 IN FLAT SLUSH DBL DISC ORS

## (undated) DEVICE — NEEDLE COUNTER: Brand: DEROYAL

## (undated) DEVICE — TOWEL SURG W16XL26IN BLU NONFENESTRATED DLX ST 2 PER PK

## (undated) DEVICE — MEDI-VAC SUCTION HIGH CAPACITY: Brand: CARDINAL HEALTH

## (undated) DEVICE — NEEDLE HYPO 22GA L1.5IN BLK S STL HUB POLYPR SHLD REG BVL

## (undated) DEVICE — TOOL F2/8TA23 LEGEND 8CM 2.3MM TAPER: Brand: MIDAS REX™

## (undated) DEVICE — SNARE POLYP M W27MMXL240CM OVL STIFF DISP CAPTIVATOR

## (undated) DEVICE — PREP SKN DURAPREP 26ML APPL --

## (undated) DEVICE — FLUFF AND POLYMER UNDERPAD,EXTRA HEAVY: Brand: WINGS

## (undated) DEVICE — SSC BONE WAX: Brand: SSC BONE WAX

## (undated) DEVICE — SYR 50ML SLIP TIP NSAF LF STRL --

## (undated) DEVICE — SPONGE GZ W4XL4IN COT 12 PLY TYP VII WVN C FLD DSGN

## (undated) DEVICE — CODMAN® RANEY SCALP CLIPS 20 UNITS OF 10 CLIPS: Brand: CODMAN®

## (undated) DEVICE — SUTURE NRLN SZ 4-0 L18IN NONABSORBABLE BLK L13MM TF 1/2 CIR C584D

## (undated) DEVICE — CRANIOTOMY DRAPE, STERILE: Brand: MEDLINE

## (undated) DEVICE — SOLUTION IRRIG 1000ML H2O STRL BLT

## (undated) DEVICE — 3M™ IOBAN™ 2 ANTIMICROBIAL INCISE DRAPE 6640EZ: Brand: IOBAN™ 2

## (undated) DEVICE — FLOSEAL HEMOSTATIC MATRIX, 10 ML: Brand: FLOSEAL

## (undated) DEVICE — CANNULA ORIG TL CLR W FOAM CUSHIONS AND 14FT SUPL TB 3 CHN

## (undated) DEVICE — 8FR FRAZIER SUCTION HANDLE: Brand: CARDINAL HEALTH

## (undated) DEVICE — SYR 20ML LL STRL LF --

## (undated) DEVICE — PACKING 8004000 NEURAY 200PK 13X13MM: Brand: NEURAY ®

## (undated) DEVICE — FLEX ADVANTAGE 3000CC: Brand: FLEX ADVANTAGE

## (undated) DEVICE — GAUZE,SPONGE,4"X4",16PLY,STRL,LF,10/TRAY: Brand: MEDLINE

## (undated) DEVICE — 12FR FRAZIER SUCTION HANDLE: Brand: CARDINAL HEALTH

## (undated) DEVICE — SYRINGE MED 25GA 3ML L5/8IN SUBQ PLAS W/ DETACH NDL SFTY

## (undated) DEVICE — FORCEPS BX L240CM JAW DIA2.8MM L CAP W/ NDL MIC MESH TOOTH

## (undated) DEVICE — SHEET,DRAPE,70X100,STERILE: Brand: MEDLINE

## (undated) DEVICE — SPONGE HEMOSTAT CELLULS 4X8IN -- SURGICEL